# Patient Record
Sex: FEMALE | Race: BLACK OR AFRICAN AMERICAN | Employment: FULL TIME | URBAN - METROPOLITAN AREA
[De-identification: names, ages, dates, MRNs, and addresses within clinical notes are randomized per-mention and may not be internally consistent; named-entity substitution may affect disease eponyms.]

---

## 2017-01-23 ENCOUNTER — HOSPITAL ENCOUNTER (OUTPATIENT)
Dept: LAB | Age: 37
Discharge: HOME | End: 2017-01-23

## 2017-01-23 ENCOUNTER — OFFICE VISIT (OUTPATIENT)
Dept: FAMILY MEDICINE CLINIC | Age: 37
End: 2017-01-23

## 2017-01-23 VITALS
OXYGEN SATURATION: 98 % | RESPIRATION RATE: 20 BRPM | TEMPERATURE: 98.2 F | BODY MASS INDEX: 45.99 KG/M2 | DIASTOLIC BLOOD PRESSURE: 81 MMHG | HEART RATE: 109 BPM | WEIGHT: 293 LBS | HEIGHT: 67 IN | SYSTOLIC BLOOD PRESSURE: 118 MMHG

## 2017-01-23 DIAGNOSIS — N76.0 ACUTE VAGINITIS: ICD-10-CM

## 2017-01-23 DIAGNOSIS — I10 ESSENTIAL HYPERTENSION: ICD-10-CM

## 2017-01-23 DIAGNOSIS — Z79.4 TYPE 2 DIABETES MELLITUS WITHOUT COMPLICATION, WITH LONG-TERM CURRENT USE OF INSULIN (HCC): ICD-10-CM

## 2017-01-23 DIAGNOSIS — E11.9 TYPE 2 DIABETES MELLITUS WITHOUT COMPLICATION, WITH LONG-TERM CURRENT USE OF INSULIN (HCC): Primary | ICD-10-CM

## 2017-01-23 DIAGNOSIS — Z79.4 TYPE 2 DIABETES MELLITUS WITHOUT COMPLICATION, WITH LONG-TERM CURRENT USE OF INSULIN (HCC): Primary | ICD-10-CM

## 2017-01-23 DIAGNOSIS — E11.9 TYPE 2 DIABETES MELLITUS WITHOUT COMPLICATION, WITH LONG-TERM CURRENT USE OF INSULIN (HCC): ICD-10-CM

## 2017-01-23 LAB
BILIRUB UR QL STRIP: NEGATIVE
GLUCOSE UR-MCNC: NORMAL MG/DL
KETONES P FAST UR STRIP-MCNC: NEGATIVE MG/DL
PH UR STRIP: 6 [PH] (ref 4.6–8)
PROT UR QL STRIP: NEGATIVE MG/DL
SENTARA SPECIMEN COL,SENBCF: NORMAL
SP GR UR STRIP: 1 (ref 1–1.03)
UA UROBILINOGEN AMB POC: NORMAL (ref 0.2–1)
URINALYSIS CLARITY POC: CLEAR
URINALYSIS COLOR POC: YELLOW
URINE BLOOD POC: NORMAL
URINE LEUKOCYTES POC: NEGATIVE
URINE NITRITES POC: NEGATIVE

## 2017-01-23 PROCEDURE — 99001 SPECIMEN HANDLING PT-LAB: CPT | Performed by: FAMILY MEDICINE

## 2017-01-23 RX ORDER — FLUCONAZOLE 150 MG/1
150 TABLET ORAL DAILY
Qty: 5 TAB | Refills: 0 | Status: SHIPPED | OUTPATIENT
Start: 2017-01-23 | End: 2017-01-28

## 2017-01-23 RX ORDER — LISINOPRIL AND HYDROCHLOROTHIAZIDE 20; 25 MG/1; MG/1
1 TABLET ORAL DAILY
Qty: 90 TAB | Refills: 1 | Status: SHIPPED | OUTPATIENT
Start: 2017-01-23 | End: 2017-06-15 | Stop reason: SDUPTHER

## 2017-01-23 RX ORDER — CHLORPHENIRAMINE MALEATE 4 MG
TABLET ORAL 2 TIMES DAILY
Qty: 15 G | Refills: 3 | Status: SHIPPED | OUTPATIENT
Start: 2017-01-23

## 2017-01-23 RX ORDER — GLIPIZIDE 10 MG/1
10 TABLET ORAL 2 TIMES DAILY
Qty: 60 TAB | Refills: 3 | Status: SHIPPED | OUTPATIENT
Start: 2017-01-23 | End: 2017-07-21 | Stop reason: SDUPTHER

## 2017-01-23 RX ORDER — DILTIAZEM HYDROCHLORIDE 240 MG/1
240 CAPSULE, COATED, EXTENDED RELEASE ORAL DAILY
Qty: 90 CAP | Refills: 1 | Status: SHIPPED | OUTPATIENT
Start: 2017-01-23 | End: 2018-02-05 | Stop reason: SDUPTHER

## 2017-01-23 NOTE — PROGRESS NOTES
Chronic Illness Visit    Today's Date:  2017   Patient's Name: Shira Abrams   Patient's :  1980     History:     Chief Complaint   Patient presents with    Fatigue     pt here with c/o feeling fatigue and sluggish    High Blood Sugar     pt c/o feeling like her BG is elevated    Yeast Infection     pt c/o vaginal itching and white discharge, was treated around  but has not completed gone away       Shira Abrams is a 39 y.o. female presenting for Chronic Illness no acute concerns today. Diabetes/Hypertension/Hyperlipidemia    BP today is at goal today <130/80. Patients risk factors include: obese  Home Blood Sugars are 100-120. Denies hypoglycemic episodes  Recent hospitalizations: not since last visit  Medications regimen is as follows:  Reports side effects w/ metformin so had to cute down once a day. She says she was having hypoglycemia  Last HgbA1C: 6.1 on 16 and 6.8 (16)  Daily exercise and diet are as follows: reports healthy diet and trying to stay active  Weight is loss of 33 lbs since the last visit  Takes the below meds regularly with no side effects.     Last LDL: 102 (3/10/10)  Last DM eye exam: unknown  Last DM foot exam: 16  Last urine microalbumin: 16    Vaginitis    LMP:2017  Contraception: IUD  Onset of symptoms: 1 months was on diflucan x 1 dose but symptoms have returned  Patient reports odorous thin white discharge  Denies itching/lesions/pain  Denies safe sex  Recent Antibiotics: denies        Past Medical History   Diagnosis Date    Diabetes ([address]     Hypertension     Mitral regurgitation 2016     Past Surgical History   Procedure Laterality Date    Hx  section  12/04/15     Social History     Social History    Marital status:      Spouse name: N/A    Number of children: N/A    Years of education: N/A     Social History Main Topics    Smoking status: Never Smoker    Smokeless tobacco: Never Used    Alcohol use No    Drug use: No    Sexual activity: Not Asked     Other Topics Concern    None     Social History Narrative     Family History   Problem Relation Age of Onset    Diabetes Mother     Heart Attack Father 58     MI    Diabetes Other     Hypertension Other     Cancer Neg Hx     Heart Disease Neg Hx     Stroke Neg Hx      No Known Allergies    Problem List:      Patient Active Problem List   Diagnosis Code    SOB (shortness of breath) R06.02    Hypertension I10    Diabetes ([address] E11.9    Morbid obesity ([address] E66.01    Acute pyelonephritis N10    Septic shock ([facility]) A41.9, R65.21    Hypokalemia E87.6    Hypomagnesemia E83.42    ARDS (adult respiratory distress syndrome) ([facility]) J80    Mitral regurgitation I34.0    PAF (paroxysmal atrial fibrillation) ([facility]) I48.0       Medications:     Current Outpatient Prescriptions   Medication Sig    fluconazole (DIFLUCAN) 150 mg tablet Take 1 Tab by mouth daily for 5 days. FDA advises cautious prescribing of oral fluconazole in pregnancy.  glipiZIDE (GLUCOTROL) 10 mg tablet Take 1 Tab by mouth two (2) times a day.  dilTIAZem CD (CARDIZEM CD) 240 mg ER capsule Take 1 Cap by mouth daily.  lisinopril-hydroCHLOROthiazide (PRINZIDE, ZESTORETIC) 20-25 mg per tablet Take 1 Tab by mouth daily.  clotrimazole (LOTRIMIN) 1 % topical cream Apply  to affected area two (2) times a day.  metFORMIN (GLUCOPHAGE) 500 mg tablet Take 1 Tab by mouth two (2) times daily (with meals).  potassium chloride (K-DUR, KLOR-CON) 20 mEq tablet Take 1 Tab by mouth two (2) times a day. Indications: HYPOKALEMIA    cholecalciferol (VITAMIN D3) 1,000 unit tablet Take 2,000 Units by mouth daily.  PRENATAL #103/IRON FUMARATE/FA (PRENATAL VIT#103-IRON-FA) 27 mg iron- 1 mg tab Take 1 Tab by Mouth Once a Day.  ergocalciferol (ERGOCALCIFEROL) 50,000 unit capsule Take 1 Tab by Mouth Once a Day.  labetalol (NORMODYNE) 200 mg tablet Take 1 Tab by mouth two (2) times a day.  loratadine (CLARITIN) 10 mg tablet Take 1 Tab by mouth daily. No current facility-administered medications for this visit. Constitutional: negative for fevers, fatigue, chills and sweats  Respiratory: negative for cough, sputum or wheezing  Cardiovascular: negative for chest pain, chest pressure/discomfort, dyspnea  Gastrointestinal: negative for nausea, vomiting, diarrhea, constipation and abdominal pain  Genito: positive for vaginal discharge negative for dysuria  Musculoskeletal:negative for myalgias and arthralgias  Neurological: negative for headaches and dizziness  Behavioral/Psych: negative for anxiety and depression    Physical Assessment:   VS:    Visit Vitals    /81 (BP 1 Location: Left arm, BP Patient Position: Sitting)    Pulse (!) 109    Temp 98.2 °F (36.8 °C) (Oral)    Resp 20    Ht 5' 7\" (1.702 m)    Wt 333 lb (151 kg)    LMP 01/16/2017    SpO2 98%    BMI 52.16 kg/m2       Lab Results   Component Value Date/Time    Sodium 142 12/29/2015 02:15 AM    Potassium 3.4 12/29/2015 02:15 AM    Chloride 107 12/29/2015 02:15 AM    CO2 29 12/29/2015 02:15 AM    Anion gap 6 12/29/2015 02:15 AM    Glucose 128 12/29/2015 02:15 AM    BUN 8 12/29/2015 02:15 AM    Creatinine 0.86 12/29/2015 02:15 AM    BUN/Creatinine ratio 9 12/29/2015 02:15 AM    GFR est AA >60 12/29/2015 02:15 AM    GFR est non-AA >60 12/29/2015 02:15 AM    Calcium 8.1 12/29/2015 02:15 AM       General:   Well-groomed, obese, in no distress, pleasant, alert, appropriate and conversant. Mouth:  Good dentition, oropharynx WNL without membranes, exudates, petechiae or ulcers  Cardiovasc:   RRR, no MRG. Pulses 2+ and symmetric at distal extremities. Pulmonary:   Lungs clear bilaterally. Normal respiratory effort. Extremities:   No edema, no TTP bilateral calves. LEs warm and well-perfused. :   Red and swollen vaginal mucosa with white discharge present  Neuro:   Alert and oriented, no focal deficits.  No facial asymmetry noted. Skin:    No rashes or jaundice  MSK:   Normal ROM, 5/5 muscle strength  Psych:  No pressured speech or abnormal thought content        Assessment/Plan & Orders:       1. Type 2 diabetes mellitus without complication, with long-term current use of insulin ([address]    2. Acute vaginitis    3. Essential hypertension        Orders Placed This Encounter    NUSWAB VAGINITIS PLUS    LIPID PANEL    METABOLIC PANEL, COMPREHENSIVE    HEMOGLOBIN A1C WITH EAG    AMB POC URINALYSIS DIP STICK AUTO W/ MICRO    fluconazole (DIFLUCAN) 150 mg tablet    glipiZIDE (GLUCOTROL) 10 mg tablet    dilTIAZem CD (CARDIZEM CD) 240 mg ER capsule    lisinopril-hydroCHLOROthiazide (PRINZIDE, ZESTORETIC) 20-25 mg per tablet    clotrimazole (LOTRIMIN) 1 % topical cream       DM HgbA1c, CMP and Lipid panel ordered. Patient could not tolerate metformin so will change that to glucotrol. HTN BP doing well continue w/ current meds.      Vaginitis will send in diflucan    Obesity improving  counseled on obesity, risks of being obese, commercial diet plans, calorie counting, food journaling, exercise, follow up for weight checks     Follow up in 2-3 months    Cira Siegel MD  Family Medicine  1/23/2017  10:47 AM

## 2017-01-24 LAB
A-G RATIO,AGRAT: 1.3 RATIO (ref 1.1–2.6)
A-G RATIO,AGRAT: 1.3 RATIO (ref 1.1–2.6)
ALBUMIN SERPL-MCNC: 4.1 G/DL (ref 3.5–5)
ALBUMIN SERPL-MCNC: 4.1 G/DL (ref 3.5–5)
ALP SERPL-CCNC: 135 U/L (ref 25–115)
ALP SERPL-CCNC: 135 U/L (ref 25–115)
ALT SERPL-CCNC: 24 U/L (ref 5–40)
ALT SERPL-CCNC: 24 U/L (ref 5–40)
ANION GAP SERPL CALC-SCNC: 22 MMOL/L
ANION GAP SERPL CALC-SCNC: 22 MMOL/L
AST SERPL W P-5'-P-CCNC: 21 U/L (ref 10–37)
AST SERPL W P-5'-P-CCNC: 21 U/L (ref 10–37)
AVG GLU, 10930: 467 MG/DL (ref 91–123)
BILIRUB SERPL-MCNC: 0.2 MG/DL (ref 0.2–1.2)
BILIRUB SERPL-MCNC: 0.2 MG/DL (ref 0.2–1.2)
BUN SERPL-MCNC: 19 MG/DL (ref 6–22)
BUN SERPL-MCNC: 19 MG/DL (ref 6–22)
CALCIUM SERPL-MCNC: 9.2 MG/DL (ref 8.4–10.5)
CALCIUM SERPL-MCNC: 9.2 MG/DL (ref 8.4–10.5)
CHLORIDE SERPL-SCNC: 80 MMOL/L (ref 98–110)
CHLORIDE SERPL-SCNC: 80 MMOL/L (ref 98–110)
CHOLEST SERPL-MCNC: 168 MG/DL (ref 110–200)
CHOLEST SERPL-MCNC: 168 MG/DL (ref 110–200)
CO2 SERPL-SCNC: 22 MMOL/L (ref 20–32)
CO2 SERPL-SCNC: 22 MMOL/L (ref 20–32)
CREAT SERPL-MCNC: 0.9 MG/DL (ref 0.5–1.2)
CREAT SERPL-MCNC: 0.9 MG/DL (ref 0.5–1.2)
GFRAA, 66117: >60
GFRAA, 66117: >60
GFRNA, 66118: >60
GFRNA, 66118: >60
GLOBULIN,GLOB: 3.2 G/DL (ref 2–4)
GLOBULIN,GLOB: 3.2 G/DL (ref 2–4)
GLUCOSE SERPL-MCNC: 791 MG/DL (ref 65–99)
GLUCOSE SERPL-MCNC: 791 MG/DL (ref 65–99)
HBA1C MFR BLD HPLC: 17.9 % (ref 4.8–5.9)
HDLC SERPL-MCNC: 36 MG/DL (ref 40–59)
HDLC SERPL-MCNC: 36 MG/DL (ref 40–59)
LDLC SERPL CALC-MCNC: 65 MG/DL (ref 50–99)
LDLC SERPL CALC-MCNC: 65 MG/DL (ref 50–99)
POTASSIUM SERPL-SCNC: 4.9 MMOL/L (ref 3.5–5.5)
POTASSIUM SERPL-SCNC: 4.9 MMOL/L (ref 3.5–5.5)
PROT SERPL-MCNC: 7.3 G/DL (ref 6.4–8.3)
PROT SERPL-MCNC: 7.3 G/DL (ref 6.4–8.3)
SODIUM SERPL-SCNC: 124 MMOL/L (ref 133–145)
SODIUM SERPL-SCNC: 124 MMOL/L (ref 133–145)
TRIGL SERPL-MCNC: 334 MG/DL (ref 40–149)
TRIGL SERPL-MCNC: 334 MG/DL (ref 40–149)
VLDLC SERPL CALC-MCNC: 67 MG/DL (ref 8–30)
VLDLC SERPL CALC-MCNC: 67 MG/DL (ref 8–30)

## 2017-01-24 RX ORDER — INSULIN PUMP SYRINGE, 3 ML
EACH MISCELLANEOUS
Qty: 1 KIT | Refills: 0 | Status: SHIPPED | OUTPATIENT
Start: 2017-01-24

## 2017-01-24 RX ORDER — INSULIN GLARGINE 100 [IU]/ML
INJECTION, SOLUTION SUBCUTANEOUS
Qty: 4 EACH | Refills: 3 | Status: SHIPPED | OUTPATIENT
Start: 2017-01-24 | End: 2017-03-23 | Stop reason: SDUPTHER

## 2017-01-24 NOTE — PROGRESS NOTES
Patient called today around 9:00 am to discuss urgent labs. Left message and will send in Insulin and glucometer to local pharmacy. Also asked patient to return our call to discuss plan.    Jennifer Crain MD  1/24/2017  9:34 AM

## 2017-01-24 NOTE — PROGRESS NOTES
Patient's labs reflect uncontrolled glucose. HgbA1c 17.9 and glucose >700. Patient called and message left. Patient seen yesterday and does have a history of non compliance with metformin. Plan have patient start Lantus 20 units will titrate up for goal fasting am glucose <150. Will send in glucometer kit and Lantus to local pharmacy. Na also low at 124 most likely due to elevated glucose her corrected Na is 130 (still low) if she has any symptoms I recommend she go to ED immediately. Follow up in 1 month or sooner if she needs training on how to use Lantus. She needs to start Lantus immediately.      Dillon Myers MD  1/24/2017  9:38 AM

## 2017-01-25 LAB
BACTERIAL VAGINOSIS, NAA: NEGATIVE
CANDIDA ALBICANS, NAA, 180056: POSITIVE
CANDIDA GLABRATA, NAA, 180057: POSITIVE
CANDIDA KRUSEI, NAA, 180016: NEGATIVE
CHLAMYDIA TRACHOMATIS, NAA, 180097: NEGATIVE
NEISSERIA GONORRHOEAE, NAA, 180104: NEGATIVE
TRICH VAG BY NAA, 180087: NEGATIVE

## 2017-01-27 ENCOUNTER — TELEPHONE (OUTPATIENT)
Dept: FAMILY MEDICINE CLINIC | Age: 37
End: 2017-01-27

## 2017-01-27 NOTE — TELEPHONE ENCOUNTER
Called patient at 8:15 am at work. I told her I had been trying to call her since Tuesday she said she was having issues with her phone. She states she has been fine denies SOB or any acute symptoms. Told her the her blood sugars were 799 and the highest I had ever seen she states she did start the glipizide and is already feeling better. Dicussed that with blood sugars this high she has to be on Insulin for now. I reiterated that I had sent her Lantus and glucometer to local pharmacy and urged her to pick it up today. Patient said she would pick it up on her lunch. Discussed common signs of DKA and asked patient to go to ED for any issues. Told her the goal is am glucose <150. Told her she needs to check blood sugars daily we will titrate up Lantus and she needs to follow up next week for an appt. She agreed with the plan will call or go to ED with any acute issues. Once again reiterated the severity of this issue and how she much start treatment or go to ED today for help.      Chelsea Pineda MD  1/27/2017  Now

## 2017-02-06 ENCOUNTER — TELEPHONE (OUTPATIENT)
Dept: FAMILY MEDICINE CLINIC | Age: 37
End: 2017-02-06

## 2017-02-06 NOTE — TELEPHONE ENCOUNTER
Called patient to check on her blood sugars. Patient denies any acute concerns has started Lantus 20 Units at bedtime fasting am blood sugars are 240-260s. She reports that she is feeling fine and staying active. Recommended she titrate up slowly to Lantus 24-25 Units at bedtime reiterated ultimate goal is fasting am glucose <150. Also discussed that patient needs to make a follow up appt this week for repeat blood work to check on electrolytes. Patient agreed with the plan.      Alvena Klinefelter, MD  2/5/2017  9:30 Am

## 2017-02-07 ENCOUNTER — TELEPHONE (OUTPATIENT)
Dept: FAMILY MEDICINE CLINIC | Age: 37
End: 2017-02-07

## 2017-02-07 NOTE — TELEPHONE ENCOUNTER
Call made to pt, no answer, left message for pt, to call for a follow up appointment for follow up labs, this is needed because of her elevated labs done previously.

## 2017-02-09 ENCOUNTER — OFFICE VISIT (OUTPATIENT)
Dept: FAMILY MEDICINE CLINIC | Age: 37
End: 2017-02-09

## 2017-02-09 VITALS
HEART RATE: 98 BPM | RESPIRATION RATE: 18 BRPM | DIASTOLIC BLOOD PRESSURE: 75 MMHG | SYSTOLIC BLOOD PRESSURE: 118 MMHG | HEIGHT: 67 IN | TEMPERATURE: 98.5 F | WEIGHT: 293 LBS | OXYGEN SATURATION: 100 % | BODY MASS INDEX: 45.99 KG/M2

## 2017-02-09 DIAGNOSIS — Z79.4 TYPE 2 DIABETES MELLITUS WITHOUT COMPLICATION, WITH LONG-TERM CURRENT USE OF INSULIN (HCC): ICD-10-CM

## 2017-02-09 DIAGNOSIS — Z79.4 TYPE 2 DIABETES MELLITUS WITHOUT COMPLICATION, WITH LONG-TERM CURRENT USE OF INSULIN (HCC): Primary | ICD-10-CM

## 2017-02-09 DIAGNOSIS — E11.9 TYPE 2 DIABETES MELLITUS WITHOUT COMPLICATION, WITH LONG-TERM CURRENT USE OF INSULIN (HCC): ICD-10-CM

## 2017-02-09 DIAGNOSIS — E11.9 TYPE 2 DIABETES MELLITUS WITHOUT COMPLICATION, WITH LONG-TERM CURRENT USE OF INSULIN (HCC): Primary | ICD-10-CM

## 2017-02-09 DIAGNOSIS — E66.01 MORBID OBESITY DUE TO EXCESS CALORIES (HCC): ICD-10-CM

## 2017-02-09 DIAGNOSIS — I10 ESSENTIAL HYPERTENSION: ICD-10-CM

## 2017-02-09 RX ORDER — BUPROPION HYDROCHLORIDE 75 MG/1
75 TABLET ORAL 2 TIMES DAILY
Qty: 60 TAB | Refills: 2 | Status: SHIPPED | OUTPATIENT
Start: 2017-02-09 | End: 2017-06-15 | Stop reason: SDUPTHER

## 2017-02-09 NOTE — PROGRESS NOTES
Chronic Illness Visit    Today's Date:  2017   Patient's Name: Rowena Del Rosario   Patient's :  1980     History:     Chief Complaint   Patient presents with   Johny Badillo is a 39 y.o. female presenting for Chronic Illness no acute concerns today. Diabetes/Hypertension/Hyperlipidemia    BP today is at goal today <130/80. Patients risk factors include: obese  Home Blood Sugars are 250s. Denies hypoglycemic episodes  Recent hospitalizations: not since last visit  Medications regimen is as follows:  Reports side effects w/ metformin so had to cute down once a day. Lantus 24 Units daily. Last HgbA1C: (2017) 17.9  Daily exercise and diet are as follows: reports healthy diet and trying to stay active  Weight  Gain of 10 lbs since the last visit. Takes the below meds regularly with no side effects.     Last LDL: 102 (3/10/10)  Last DM eye exam: unknown  Last DM foot exam: 16  Last urine microalbumin: 16    Obesity    Goal Weight: 300 lbs  Concerns about current weight  Not currently on any medications for this problem  Diet and Exercise routine: reports healthy diet and daily exercise  Weight loss since last visit: none  Motivation Level: high    Past Medical History   Diagnosis Date    Diabetes ([address]     Hypertension     Mitral regurgitation 2016     Past Surgical History   Procedure Laterality Date    Hx  section  12/04/15     Social History     Social History    Marital status:      Spouse name: N/A    Number of children: N/A    Years of education: N/A     Social History Main Topics    Smoking status: Never Smoker    Smokeless tobacco: Never Used    Alcohol use 1.2 oz/week     0 Standard drinks or equivalent, 1 Glasses of wine, 1 Shots of liquor per week      Comment: rarley    Drug use: No    Sexual activity: Yes     Partners: Male     Birth control/ protection: None     Other Topics Concern    None     Social History Narrative     Family History   Problem Relation Age of Onset    Diabetes Mother     Heart Attack Father 58     MI    Diabetes Other     Hypertension Other     Cancer Neg Hx     Heart Disease Neg Hx     Stroke Neg Hx      No Known Allergies    Problem List:      Patient Active Problem List   Diagnosis Code    SOB (shortness of breath) R06.02    Hypertension I10    Diabetes [address] E11.9    Morbid obesity ([facility] E66.01    Acute pyelonephritis N10    Septic shock ([facility]) A41.9, R65.21    Hypokalemia E87.6    Hypomagnesemia E83.42    ARDS (adult respiratory distress syndrome) ([facility]) J80    Mitral regurgitation I34.0    PAF (paroxysmal atrial fibrillation) ([facility]) I48.0       Medications:     Current Outpatient Prescriptions   Medication Sig    buPROPion (WELLBUTRIN) 75 mg tablet Take 1 Tab by mouth two (2) times a day.  insulin glargine (LANTUS SOLOSTAR) 100 unit/mL (3 mL) pen 20 Units daily Subcutaneous    Blood-Glucose Meter monitoring kit For daily blood sugar check    glipiZIDE (GLUCOTROL) 10 mg tablet Take 1 Tab by mouth two (2) times a day.  dilTIAZem CD (CARDIZEM CD) 240 mg ER capsule Take 1 Cap by mouth daily.  lisinopril-hydroCHLOROthiazide (PRINZIDE, ZESTORETIC) 20-25 mg per tablet Take 1 Tab by mouth daily.  cholecalciferol (VITAMIN D3) 1,000 unit tablet Take 2,000 Units by mouth daily.  clotrimazole (LOTRIMIN) 1 % topical cream Apply  to affected area two (2) times a day.  metFORMIN (GLUCOPHAGE) 500 mg tablet Take 1 Tab by mouth two (2) times daily (with meals).  PRENATAL #103/IRON FUMARATE/FA (PRENATAL VIT#103-IRON-FA) 27 mg iron- 1 mg tab Take 1 Tab by Mouth Once a Day.  ergocalciferol (ERGOCALCIFEROL) 50,000 unit capsule Take 1 Tab by Mouth Once a Day.  labetalol (NORMODYNE) 200 mg tablet Take 1 Tab by mouth two (2) times a day.  loratadine (CLARITIN) 10 mg tablet Take 1 Tab by mouth daily.     potassium chloride (K-DUR, KLOR-CON) 20 mEq tablet Take 1 Tab by mouth two (2) times a day. Indications: HYPOKALEMIA     No current facility-administered medications for this visit. Constitutional: negative for fevers, fatigue, chills and sweats  Respiratory: negative for cough, sputum or wheezing  Cardiovascular: negative for chest pain, chest pressure/discomfort, dyspnea  Gastrointestinal: negative for nausea, vomiting, diarrhea, constipation and abdominal pain  Genito: positive for vaginal discharge negative for dysuria  Musculoskeletal:negative for myalgias and arthralgias  Neurological: negative for headaches and dizziness  Behavioral/Psych: negative for anxiety and depression    Physical Assessment:   VS:    Visit Vitals    /75 (BP 1 Location: Right arm, BP Patient Position: Sitting)    Pulse 98    Temp 98.5 °F (36.9 °C) (Oral)    Resp 18    Ht 5' 7\" (1.702 m)    Wt 343 lb 3.2 oz (155.7 kg)    LMP 01/16/2017    SpO2 100%    BMI 53.75 kg/m2       Lab Results   Component Value Date/Time    Sodium 124 01/23/2017 11:41 AM    Sodium 124 01/23/2017 11:41 AM    Potassium 4.9 01/23/2017 11:41 AM    Potassium 4.9 01/23/2017 11:41 AM    Chloride 80 01/23/2017 11:41 AM    Chloride 80 01/23/2017 11:41 AM    CO2 22 01/23/2017 11:41 AM    CO2 22 01/23/2017 11:41 AM    Anion gap 22.0 01/23/2017 11:41 AM    Anion gap 22.0 01/23/2017 11:41 AM    Glucose 791 01/23/2017 11:41 AM    Glucose 791 01/23/2017 11:41 AM    BUN 19 01/23/2017 11:41 AM    BUN 19 01/23/2017 11:41 AM    Creatinine 0.9 01/23/2017 11:41 AM    Creatinine 0.9 01/23/2017 11:41 AM    BUN/Creatinine ratio 9 12/29/2015 02:15 AM    GFR est AA >60 12/29/2015 02:15 AM    GFR est non-AA >60 12/29/2015 02:15 AM    Calcium 9.2 01/23/2017 11:41 AM    Calcium 9.2 01/23/2017 11:41 AM       General:   Well-groomed, obese, in no distress, pleasant, alert, appropriate and conversant. Mouth:  Good dentition, oropharynx WNL without membranes, exudates, petechiae or ulcers  Cardiovasc:   RRR, no MRG.   Pulses 2+ and symmetric at distal extremities. Pulmonary:   Lungs clear bilaterally. Normal respiratory effort. Extremities:   No edema, no TTP bilateral calves. LEs warm and well-perfused. :   Red and swollen vaginal mucosa with white discharge present  Neuro:   Alert and oriented, no focal deficits. No facial asymmetry noted. Skin:    No rashes or jaundice  MSK:   Normal ROM, 5/5 muscle strength  Psych:  No pressured speech or abnormal thought content        Assessment/Plan & Orders:       1. Type 2 diabetes mellitus without complication, with long-term current use of insulin ([facility]    2. Morbid obesity due to excess calories ([address]    3. Essential hypertension        Orders Placed This Encounter    METABOLIC PANEL, COMPREHENSIVE    buPROPion (WELLBUTRIN) 75 mg tablet       DM HgbA1c 17.9 at last visit. Since then patient was started on glipizide and Lantus 24 Units am blood sugars are 240's improving. Recommend titrating up to 30 Units over the next few weeks for goal am glucose <150. HgbA1c at the next visit. Imbalance of electrolytes will repeat CMP. -urine and foot exam at the next visit    HTN BP doing well continue w/ current meds.      Obesity stable discussed that Insulin can cause weight gain will do wellbutrin for a few months to see if this helps with weight  counseled on obesity, risks of being obese, commercial diet plans, calorie counting, food journaling, exercise, follow up for weight checks     Follow up in 6 weeks    Ann Edwards MD  Family Medicine  2/9/2017  9:00 AM

## 2017-02-09 NOTE — PATIENT INSTRUCTIONS

## 2017-03-10 DIAGNOSIS — I10 ESSENTIAL HYPERTENSION: ICD-10-CM

## 2017-03-10 RX ORDER — DILTIAZEM HYDROCHLORIDE 240 MG/1
240 CAPSULE, COATED, EXTENDED RELEASE ORAL DAILY
Qty: 90 CAP | Refills: 1 | Status: CANCELLED | OUTPATIENT
Start: 2017-03-10

## 2017-03-10 RX ORDER — INSULIN GLARGINE 100 [IU]/ML
INJECTION, SOLUTION SUBCUTANEOUS
Qty: 4 EACH | Refills: 3 | Status: CANCELLED | OUTPATIENT
Start: 2017-03-10

## 2017-03-10 RX ORDER — LISINOPRIL AND HYDROCHLOROTHIAZIDE 20; 25 MG/1; MG/1
1 TABLET ORAL DAILY
Qty: 90 TAB | Refills: 1 | Status: CANCELLED | OUTPATIENT
Start: 2017-03-10

## 2017-03-10 NOTE — TELEPHONE ENCOUNTER
Requested Prescriptions     Pending Prescriptions Disp Refills    lisinopril-hydroCHLOROthiazide (PRINZIDE, ZESTORETIC) 20-25 mg per tablet 90 Tab 1     Sig: Take 1 Tab by mouth daily.  dilTIAZem CD (CARDIZEM CD) 240 mg ER capsule 90 Cap 1     Sig: Take 1 Cap by mouth daily.     insulin glargine (LANTUS SOLOSTAR) 100 unit/mL (3 mL) pen 4 Each 3     Si Units daily Subcutaneous

## 2017-03-23 ENCOUNTER — OFFICE VISIT (OUTPATIENT)
Dept: FAMILY MEDICINE CLINIC | Age: 37
End: 2017-03-23

## 2017-03-23 VITALS
SYSTOLIC BLOOD PRESSURE: 120 MMHG | TEMPERATURE: 95.4 F | HEART RATE: 96 BPM | RESPIRATION RATE: 18 BRPM | OXYGEN SATURATION: 100 % | HEIGHT: 67 IN | DIASTOLIC BLOOD PRESSURE: 67 MMHG | WEIGHT: 293 LBS | BODY MASS INDEX: 45.99 KG/M2

## 2017-03-23 DIAGNOSIS — Z79.4 TYPE 2 DIABETES MELLITUS WITHOUT COMPLICATION, WITH LONG-TERM CURRENT USE OF INSULIN (HCC): Primary | ICD-10-CM

## 2017-03-23 DIAGNOSIS — I10 ESSENTIAL HYPERTENSION: ICD-10-CM

## 2017-03-23 DIAGNOSIS — E11.9 TYPE 2 DIABETES MELLITUS WITHOUT COMPLICATION, WITH LONG-TERM CURRENT USE OF INSULIN (HCC): Primary | ICD-10-CM

## 2017-03-23 LAB — HBA1C MFR BLD HPLC: 9.8 %

## 2017-03-23 RX ORDER — INSULIN GLARGINE 100 [IU]/ML
INJECTION, SOLUTION SUBCUTANEOUS
Qty: 4 EACH | Refills: 3 | Status: SHIPPED | OUTPATIENT
Start: 2017-03-23 | End: 2018-02-05 | Stop reason: SDUPTHER

## 2017-03-23 NOTE — MR AVS SNAPSHOT
Visit Information Date & Time Provider Department Dept. Phone Encounter #  
 3/23/2017  9:15 AM Joao Carnes [phone] [id number] Your Appointments 6/8/2017 10:00 AM  
FOLLOW UP EXAM with MD Joao Carnes [facility]) Appt Note: 3 month f/u  
 [address] [facility] [address]  
[facility] [id number] Upcoming Health Maintenance Date Due  
 EYE EXAM RETINAL OR DILATED Q1 5/2/1990 DTaP/Tdap/Td series (1 - Tdap) 5/2/2001 FOOT EXAM Q1 2/18/2017 MICROALBUMIN Q1 2/18/2017 HEMOGLOBIN A1C Q6M 7/23/2017 LIPID PANEL Q1 1/23/2018 PAP AKA CERVICAL CYTOLOGY 1/1/2019 Allergies as of 3/23/2017  Review Complete On: 3/23/2017 By: Colin Santos MD  
 No Known Allergies Current Immunizations  Never Reviewed Name Date Pneumococcal Polysaccharide (PPSV-23) 12/29/2015 10:05 AM  
  
 Not reviewed this visit You Were Diagnosed With   
  
 Codes Comments Type 2 diabetes mellitus without complication, with long-term current use of insulin (HCC)    -  Primary ICD-10-CM: E11.9, Z79.4 ICD-9-CM: 250.00, V58.67 Essential hypertension     ICD-10-CM: I10 
ICD-9-CM: 401.9 Uncontrolled type 2 diabetes mellitus with complication, without long-term current use of insulin (HCC)     ICD-10-CM: E11.8, E11.65 ICD-9-CM: 250.82 Vitals BP Pulse Temp Resp Height(growth percentile) Weight(growth percentile) 120/67 96 95.4 °F (35.2 °C) 18 5' 7\" (1.702 m) 328 lb (148.8 kg) LMP SpO2 BMI OB Status Smoking Status 03/16/2017 100% 51.37 kg/m2 Having regular periods Never Smoker Vitals History BMI and BSA Data Body Mass Index Body Surface Area  
 51.37 kg/m 2 2.65 m 2 Preferred Pharmacy Pharmacy Name Phone [id number]. Siobhan Lozano, [address] [facility] [phone] Your Updated Medication List  
  
   
This list is accurate as of: 3/23/17  9:46 AM.  Always use your most recent med list.  
  
  
  
  
 Blood-Glucose Meter monitoring kit For daily blood sugar check buPROPion 75 mg tablet Commonly known as:  [facility] Take 1 Tab by mouth two (2) times a day. cholecalciferol 1,000 unit tablet Commonly known as:  VITAMIN D3 Take 2,000 Units by mouth daily. clotrimazole 1 % topical cream  
Commonly known as:  Candiss English Apply  to affected area two (2) times a day. dilTIAZem  mg ER capsule Commonly known as:  CARDIZEM CD Take 1 Cap by mouth daily. ergocalciferol 50,000 unit capsule Commonly known as:  ERGOCALCIFEROL Take 1 Tab by Mouth Once a Day. glipiZIDE 10 mg tablet Commonly known as:  Gukira Ramona Take 1 Tab by mouth two (2) times a day. insulin glargine 100 unit/mL (3 mL) pen Commonly known as:  LANTUS SOLOSTAR Up to 30 Units daily Subcutaneous  
  
 labetalol 200 mg tablet Commonly known as:  Oli Tyrone Take 1 Tab by mouth two (2) times a day. lisinopril-hydroCHLOROthiazide 20-25 mg per tablet Commonly known as:  Brissa Polo Take 1 Tab by mouth daily. loratadine 10 mg tablet Commonly known as:  Kevin Blowers Take 1 Tab by mouth daily. potassium chloride 20 mEq tablet Commonly known as:  K-DUR, KLOR-CON Take 1 Tab by mouth two (2) times a day. Indications: HYPOKALEMIA  
  
 prenatal vit#103-iron-FA 27 mg iron- 1 mg Tab Take 1 Tab by Mouth Once a Day. Prescriptions Sent to Pharmacy Refills  
 insulin glargine (LANTUS SOLOSTAR) 100 unit/mL (3 mL) pen 3 Sig: Up to 30 Units daily Subcutaneous Class: Normal  
 Pharmacy: [id number], [address] [facility] #: [phone] We Performed the Following AMB POC HEMOGLOBIN A1C [40549 CPT(R)] Patient Instructions Learning About Diabetes Food Guidelines Your Care Instructions Meal planning is important to manage diabetes. It helps keep your blood sugar at a target level (which you set with your doctor). You don't have to eat special foods. You can eat what your family eats, including sweets once in a while. But you do have to pay attention to how often you eat and how much you eat of certain foods. You may want to work with a dietitian or a certified diabetes educator (CDE) to help you plan meals and snacks. A dietitian or CDE can also help you lose weight if that is one of your goals. What should you know about eating carbs? Managing the amount of carbohydrate (carbs) you eat is an important part of healthy meals when you have diabetes. Carbohydrate is found in many foods. · Learn which foods have carbs. And learn the amounts of carbs in different foods. ¨ Bread, cereal, pasta, and rice have about 15 grams of carbs in a serving. A serving is 1 slice of bread (1 ounce), ½ cup of cooked cereal, or 1/3 cup of cooked pasta or rice. ¨ Fruits have 15 grams of carbs in a serving. A serving is 1 small fresh fruit, such as an apple or orange; ½ of a banana; ½ cup of cooked or canned fruit; ½ cup of fruit juice; 1 cup of melon or raspberries; or 2 tablespoons of dried fruit. ¨ Milk and no-sugar-added yogurt have 15 grams of carbs in a serving. A serving is 1 cup of milk or 2/3 cup of no-sugar-added yogurt. ¨ Starchy vegetables have 15 grams of carbs in a serving. A serving is ½ cup of mashed potatoes or sweet potato; 1 cup winter squash; ½ of a small baked potato; ½ cup of cooked beans; or ½ cup cooked corn or green peas. · Learn how much carbs to eat each day and at each meal. A dietitian or CDE can teach you how to keep track of the amount of carbs you eat. This is called carbohydrate counting.  
· If you are not sure how to count carbohydrate grams, use the Plate Method to plan meals. It is a good, quick way to make sure that you have a balanced meal. It also helps you spread carbs throughout the day. ¨ Divide your plate by types of foods. Put non-starchy vegetables on half the plate, meat or other protein food on one-quarter of the plate, and a grain or starchy vegetable in the final quarter of the plate. To this you can add a small piece of fruit and 1 cup of milk or yogurt, depending on how many carbs you are supposed to eat at a meal. 
· Try to eat about the same amount of carbs at each meal. Do not \"save up\" your daily allowance of carbs to eat at one meal. 
· Proteins have very little or no carbs per serving. Examples of proteins are beef, chicken, turkey, fish, eggs, tofu, cheese, cottage cheese, and peanut butter. A serving size of meat is 3 ounces, which is about the size of a deck of cards. Examples of meat substitute serving sizes (equal to 1 ounce of meat) are 1/4 cup of cottage cheese, 1 egg, 1 tablespoon of peanut butter, and ½ cup of tofu. How can you eat out and still eat healthy? · Learn to estimate the serving sizes of foods that have carbohydrate. If you measure food at home, it will be easier to estimate the amount in a serving of restaurant food. · If the meal you order has too much carbohydrate (such as potatoes, corn, or baked beans), ask to have a low-carbohydrate food instead. Ask for a salad or green vegetables. · If you use insulin, check your blood sugar before and after eating out to help you plan how much to eat in the future. · If you eat more carbohydrate at a meal than you had planned, take a walk or do other exercise. This will help lower your blood sugar. What else should you know? · Limit saturated fat, such as the fat from meat and dairy products. This is a healthy choice because people who have diabetes are at higher risk of heart disease.  So choose lean cuts of meat and nonfat or low-fat dairy products. Use olive or canola oil instead of butter or shortening when cooking. · Don't skip meals. Your blood sugar may drop too low if you skip meals and take insulin or certain medicines for diabetes. · Check with your doctor before you drink alcohol. Alcohol can cause your blood sugar to drop too low. Alcohol can also cause a bad reaction if you take certain diabetes medicines. Follow-up care is a key part of your treatment and safety. Be sure to make and go to all appointments, and call your doctor if you are having problems. It's also a good idea to know your test results and keep a list of the medicines you take. Where can you learn more? Go to http://mirtha-ronn.info/. Enter U016 in the search box to learn more about \"Learning About Diabetes Food Guidelines. \" Current as of: May 23, 2016 Content Version: 11.1 © 8648-4159 "[employer] Care instructions adapted under license by [employer] (which disclaims liability or warranty for this information). If you have questions about a medical condition or this instruction, always ask your healthcare professional. [address] any warranty or liability for your use of this information. Introducing [facility] HE[facility]! Dear Memo Medrano: 
Thank you for requesting a mojio account. Our records indicate that you have previously registered for a mojio account but its currently inactive. Please call our mojio support line at [phone]. Additional Information If you have questions, please visit the Frequently Asked Questions section of the mojio website at [email or web address] Remember, mojio is NOT to be used for urgent needs. For medical emergencies, dial 911. Now available from your iPhone and Android! Please provide this summary of care documentation to your next provider. Your primary care clinician is listed as Seble Wood. If you have any questions after today's visit, please call [phone].

## 2017-03-23 NOTE — PROGRESS NOTES
Chronic Illness Visit    Today's Date:  3/23/2017   Patient's Name: Morales Sweeney   Patient's :  1980     History:     Chief Complaint   Patient presents with    Follow Up Chronic Condition     HTN and Diabetes       Morales Sweeney is a 39 y.o. female presenting for Chronic Illness    Diabetes/Hypertension/Hyperlipidemia    BP today is at goal today <130/80. Patients risk factors include: obese  Home Blood Sugars are 100-130s. Denies hypoglycemic episodes  Recent hospitalizations: not since last visit  Medications regimen is as follows:  Lantus up to 30 Units daily. Last HgbA1C: (2017) 17.9 and 9.8 (2017)  Daily exercise and diet are as follows: reports healthy diet and trying to stay active  Weight loss of 15 lbs since the last visit. Takes the below meds regularly with no side effects.     Last LDL: 102 (3/10/10)  Last DM eye exam: unknown  Last DM foot exam: 16  Last urine microalbumin: 16    Obesity    Goal Weight: 300 lbs  Concerns about current weight  Not currently on any medications for this problem  Diet and Exercise routine: reports healthy diet and daily exercise  Weight loss since last visit: 15 lbs  Motivation Level: high    Past Medical History:   Diagnosis Date    Diabetes ([address]     Hypertension     Mitral regurgitation 2016     Past Surgical History:   Procedure Laterality Date    HX  SECTION  12/04/15     Social History     Social History    Marital status:      Spouse name: N/A    Number of children: N/A    Years of education: N/A     Social History Main Topics    Smoking status: Never Smoker    Smokeless tobacco: Never Used    Alcohol use 1.2 oz/week     0 Standard drinks or equivalent, 1 Glasses of wine, 1 Shots of liquor per week      Comment: wanda    Drug use: No    Sexual activity: Yes     Partners: Male     Birth control/ protection: None     Other Topics Concern    None     Social History Narrative     Family History Problem Relation Age of Onset    Diabetes Mother     Heart Attack Father 58     MI    Diabetes Other     Hypertension Other     Cancer Neg Hx     Heart Disease Neg Hx     Stroke Neg Hx      No Known Allergies    Problem List:      Patient Active Problem List   Diagnosis Code    SOB (shortness of breath) R06.02    Hypertension I10    Diabetes [address] E11.9    Morbid obesity ([facility] E66.01    Acute pyelonephritis N10    Septic shock ([facility]) A41.9, R65.21    Hypokalemia E87.6    Hypomagnesemia E83.42    ARDS (adult respiratory distress syndrome) ([facility]) J80    Mitral regurgitation I34.0    PAF (paroxysmal atrial fibrillation) ([facility]) I48.0       Medications:     Current Outpatient Prescriptions   Medication Sig    insulin glargine (LANTUS SOLOSTAR) 100 unit/mL (3 mL) pen Up to 30 Units daily Subcutaneous    buPROPion (WELLBUTRIN) 75 mg tablet Take 1 Tab by mouth two (2) times a day.  Blood-Glucose Meter monitoring kit For daily blood sugar check    glipiZIDE (GLUCOTROL) 10 mg tablet Take 1 Tab by mouth two (2) times a day.  dilTIAZem CD (CARDIZEM CD) 240 mg ER capsule Take 1 Cap by mouth daily.  lisinopril-hydroCHLOROthiazide (PRINZIDE, ZESTORETIC) 20-25 mg per tablet Take 1 Tab by mouth daily.  cholecalciferol (VITAMIN D3) 1,000 unit tablet Take 2,000 Units by mouth daily.  ergocalciferol (ERGOCALCIFEROL) 50,000 unit capsule Take 1 Tab by Mouth Once a Day.  loratadine (CLARITIN) 10 mg tablet Take 1 Tab by mouth daily.  potassium chloride (K-DUR, KLOR-CON) 20 mEq tablet Take 1 Tab by mouth two (2) times a day. Indications: HYPOKALEMIA    clotrimazole (LOTRIMIN) 1 % topical cream Apply  to affected area two (2) times a day.  PRENATAL #103/IRON FUMARATE/FA (PRENATAL VIT#103-IRON-FA) 27 mg iron- 1 mg tab Take 1 Tab by Mouth Once a Day.  labetalol (NORMODYNE) 200 mg tablet Take 1 Tab by mouth two (2) times a day. No current facility-administered medications for this visit. Constitutional: negative for fevers, fatigue, chills and sweats  Respiratory: negative for cough, sputum or wheezing  Cardiovascular: negative for chest pain, chest pressure/discomfort, dyspnea  Gastrointestinal: negative for nausea, vomiting, diarrhea, constipation and abdominal pain  Genito: positive for vaginal discharge negative for dysuria  Musculoskeletal:negative for myalgias and arthralgias  Neurological: negative for headaches and dizziness  Behavioral/Psych: negative for anxiety and depression    Physical Assessment:   VS:    Visit Vitals    /67    Pulse 96    Temp 95.4 °F (35.2 °C)    Resp 18    Ht 5' 7\" (1.702 m)    Wt 328 lb (148.8 kg)    LMP 03/16/2017    SpO2 100%    BMI 51.37 kg/m2       Lab Results   Component Value Date/Time    Sodium 124 01/23/2017 11:41 AM    Sodium 124 01/23/2017 11:41 AM    Potassium 4.9 01/23/2017 11:41 AM    Potassium 4.9 01/23/2017 11:41 AM    Chloride 80 01/23/2017 11:41 AM    Chloride 80 01/23/2017 11:41 AM    CO2 22 01/23/2017 11:41 AM    CO2 22 01/23/2017 11:41 AM    Anion gap 22.0 01/23/2017 11:41 AM    Anion gap 22.0 01/23/2017 11:41 AM    Glucose 791 01/23/2017 11:41 AM    Glucose 791 01/23/2017 11:41 AM    BUN 19 01/23/2017 11:41 AM    BUN 19 01/23/2017 11:41 AM    Creatinine 0.9 01/23/2017 11:41 AM    Creatinine 0.9 01/23/2017 11:41 AM    BUN/Creatinine ratio 9 12/29/2015 02:15 AM    GFR est AA >60 12/29/2015 02:15 AM    GFR est non-AA >60 12/29/2015 02:15 AM    Calcium 9.2 01/23/2017 11:41 AM    Calcium 9.2 01/23/2017 11:41 AM       General:   Well-groomed, obese, in no distress, pleasant, alert, appropriate and conversant. Mouth:  Good dentition, oropharynx WNL without membranes, exudates, petechiae or ulcers  Cardiovasc:   RRR, no MRG. Pulses 2+ and symmetric at distal extremities. Pulmonary:   Lungs clear bilaterally. Normal respiratory effort. Extremities:   No edema, no TTP bilateral calves. LEs warm and well-perfused.   :   Red and swollen vaginal mucosa with white discharge present  Neuro:   Alert and oriented, no focal deficits. No facial asymmetry noted. Skin:    No rashes or jaundice  MSK:   Normal ROM, 5/5 muscle strength  Psych:  No pressured speech or abnormal thought content        Assessment/Plan & Orders:       1. Type 2 diabetes mellitus without complication, with long-term current use of insulin ([address]    2. Essential hypertension    3. Uncontrolled type 2 diabetes mellitus with complication, without long-term current use of insulin ([address]        Orders Placed This Encounter    AMB POC HEMOGLOBIN A1C    insulin glargine (LANTUS SOLOSTAR) 100 unit/mL (3 mL) pen       DM HgbA1c 17.9 at last visit greatly improved it is 9.8 today. She has also had weight loss and doing well. Patient takes 20-30 Units daily. -foot exam and urine microablumin next week    HTN BP doing well continue w/ current meds.      Obesity stable discussed that Insulin can cause weight gain will do wellbutrin for a few months to see if this helps with weight  counseled on obesity, risks of being obese, commercial diet plans, calorie counting, food journaling, exercise, follow up for weight checks     Follow up in 2-3 months    Irais Rob MD  Family Medicine  3/23/2017  9:00 AM

## 2017-03-23 NOTE — PATIENT INSTRUCTIONS

## 2017-06-15 DIAGNOSIS — E66.01 MORBID OBESITY DUE TO EXCESS CALORIES (HCC): ICD-10-CM

## 2017-06-15 DIAGNOSIS — I10 ESSENTIAL HYPERTENSION: ICD-10-CM

## 2017-06-15 RX ORDER — LISINOPRIL AND HYDROCHLOROTHIAZIDE 20; 25 MG/1; MG/1
1 TABLET ORAL DAILY
Qty: 90 TAB | Refills: 1 | Status: SHIPPED | OUTPATIENT
Start: 2017-06-15 | End: 2018-02-05 | Stop reason: SDUPTHER

## 2017-06-15 RX ORDER — BUPROPION HYDROCHLORIDE 75 MG/1
75 TABLET ORAL 2 TIMES DAILY
Qty: 60 TAB | Refills: 2 | Status: SHIPPED | OUTPATIENT
Start: 2017-06-15 | End: 2018-02-05 | Stop reason: SDUPTHER

## 2017-06-15 NOTE — TELEPHONE ENCOUNTER
Requested Prescriptions     Pending Prescriptions Disp Refills    buPROPion (WELLBUTRIN) 75 mg tablet 60 Tab 2     Sig: Take 1 Tab by mouth two (2) times a day.  lisinopril-hydroCHLOROthiazide (PRINZIDE, ZESTORETIC) 20-25 mg per tablet 90 Tab 1     Sig: Take 1 Tab by mouth daily.

## 2017-06-29 DIAGNOSIS — I10 ESSENTIAL HYPERTENSION: ICD-10-CM

## 2017-06-29 DIAGNOSIS — E11.9 TYPE 2 DIABETES MELLITUS WITHOUT COMPLICATION, WITH LONG-TERM CURRENT USE OF INSULIN (HCC): ICD-10-CM

## 2017-06-29 DIAGNOSIS — Z79.4 TYPE 2 DIABETES MELLITUS WITHOUT COMPLICATION, WITH LONG-TERM CURRENT USE OF INSULIN (HCC): ICD-10-CM

## 2017-06-29 NOTE — TELEPHONE ENCOUNTER
Requested Prescriptions     Pending Prescriptions Disp Refills    dilTIAZem CD (CARDIZEM CD) 240 mg ER capsule 90 Cap 1     Sig: Take 1 Cap by mouth daily.  glipiZIDE (GLUCOTROL) 10 mg tablet 60 Tab 3     Sig: Take 1 Tab by mouth two (2) times a day.

## 2017-07-02 RX ORDER — DILTIAZEM HYDROCHLORIDE 240 MG/1
240 CAPSULE, COATED, EXTENDED RELEASE ORAL DAILY
Qty: 90 CAP | Refills: 1 | OUTPATIENT
Start: 2017-07-02

## 2017-07-02 RX ORDER — GLIPIZIDE 10 MG/1
10 TABLET ORAL 2 TIMES DAILY
Qty: 60 TAB | Refills: 3 | OUTPATIENT
Start: 2017-07-02

## 2017-07-02 NOTE — TELEPHONE ENCOUNTER
Patient needs an appointment for further medication refills. It appears she cancelled her appt 6/30 with Efraín Tijerina and has not rescheduled.

## 2017-07-21 DIAGNOSIS — E11.9 TYPE 2 DIABETES MELLITUS WITHOUT COMPLICATION, WITH LONG-TERM CURRENT USE OF INSULIN (HCC): ICD-10-CM

## 2017-07-21 DIAGNOSIS — Z79.4 TYPE 2 DIABETES MELLITUS WITHOUT COMPLICATION, WITH LONG-TERM CURRENT USE OF INSULIN (HCC): ICD-10-CM

## 2017-07-21 NOTE — TELEPHONE ENCOUNTER
Received faxed refill request from [facility] for glipizide 10 mg, qty 60, 3 refills

## 2017-07-24 RX ORDER — GLIPIZIDE 10 MG/1
10 TABLET ORAL 2 TIMES DAILY
Qty: 60 TAB | Refills: 0 | Status: SHIPPED | OUTPATIENT
Start: 2017-07-24 | End: 2018-02-05 | Stop reason: CLARIF

## 2017-07-24 NOTE — TELEPHONE ENCOUNTER
Rx sent for 30 days with no refills. Patient is overdue for a routine follow up visit. Please schedule.  She cannot get any further refills of this medication without an appointment

## 2017-07-24 NOTE — TELEPHONE ENCOUNTER
Call made to pt , no answer, left message for her to call office. Call in reference to her refill request for Glipizide. Refill request approved for a 30 day supply and pt will have to be seen I office before any other will be granted.

## 2018-02-05 ENCOUNTER — OFFICE VISIT (OUTPATIENT)
Dept: FAMILY MEDICINE CLINIC | Age: 38
End: 2018-02-05

## 2018-02-05 VITALS
HEIGHT: 67 IN | TEMPERATURE: 97.9 F | BODY MASS INDEX: 45.99 KG/M2 | OXYGEN SATURATION: 100 % | HEART RATE: 83 BPM | RESPIRATION RATE: 18 BRPM | DIASTOLIC BLOOD PRESSURE: 69 MMHG | WEIGHT: 293 LBS | SYSTOLIC BLOOD PRESSURE: 105 MMHG

## 2018-02-05 DIAGNOSIS — E66.01 MORBID OBESITY DUE TO EXCESS CALORIES (HCC): ICD-10-CM

## 2018-02-05 DIAGNOSIS — Z79.4 TYPE 2 DIABETES MELLITUS WITHOUT COMPLICATION, WITH LONG-TERM CURRENT USE OF INSULIN (HCC): ICD-10-CM

## 2018-02-05 DIAGNOSIS — I10 ESSENTIAL HYPERTENSION: ICD-10-CM

## 2018-02-05 DIAGNOSIS — E11.9 TYPE 2 DIABETES MELLITUS WITHOUT COMPLICATION, WITH LONG-TERM CURRENT USE OF INSULIN (HCC): ICD-10-CM

## 2018-02-05 DIAGNOSIS — E11.9 TYPE 2 DIABETES MELLITUS WITHOUT COMPLICATION, WITH LONG-TERM CURRENT USE OF INSULIN (HCC): Primary | ICD-10-CM

## 2018-02-05 DIAGNOSIS — Z79.4 TYPE 2 DIABETES MELLITUS WITHOUT COMPLICATION, WITH LONG-TERM CURRENT USE OF INSULIN (HCC): Primary | ICD-10-CM

## 2018-02-05 LAB
HBA1C MFR BLD HPLC: 6.5 %
MICROALBUMIN UR TEST STR-MCNC: 30 MG/L
MICROALBUMIN/CREAT RATIO POC: 200 MG/G

## 2018-02-05 RX ORDER — GLIPIZIDE 10 MG/1
10 TABLET ORAL 2 TIMES DAILY
Qty: 60 TAB | Refills: 6 | Status: CANCELLED | OUTPATIENT
Start: 2018-02-05

## 2018-02-05 RX ORDER — GLIPIZIDE 10 MG/1
10 TABLET, FILM COATED, EXTENDED RELEASE ORAL DAILY
Qty: 90 TAB | Refills: 1 | Status: SHIPPED | OUTPATIENT
Start: 2018-02-05

## 2018-02-05 RX ORDER — INSULIN GLARGINE 100 [IU]/ML
INJECTION, SOLUTION SUBCUTANEOUS
Qty: 4 ML | Refills: 3 | Status: SHIPPED | OUTPATIENT
Start: 2018-02-05

## 2018-02-05 RX ORDER — LISINOPRIL AND HYDROCHLOROTHIAZIDE 20; 25 MG/1; MG/1
1 TABLET ORAL DAILY
Qty: 90 TAB | Refills: 1 | Status: SHIPPED | OUTPATIENT
Start: 2018-02-05

## 2018-02-05 RX ORDER — METFORMIN HYDROCHLORIDE 500 MG/1
500 TABLET ORAL 2 TIMES DAILY WITH MEALS
Qty: 180 TAB | Refills: 1 | Status: SHIPPED | OUTPATIENT
Start: 2018-02-05

## 2018-02-05 RX ORDER — DILTIAZEM HYDROCHLORIDE 240 MG/1
240 CAPSULE, COATED, EXTENDED RELEASE ORAL DAILY
Qty: 90 CAP | Refills: 1 | Status: SHIPPED | OUTPATIENT
Start: 2018-02-05

## 2018-02-05 RX ORDER — BUPROPION HYDROCHLORIDE 75 MG/1
75 TABLET ORAL 2 TIMES DAILY
Qty: 60 TAB | Refills: 3 | Status: SHIPPED | OUTPATIENT
Start: 2018-02-05

## 2018-02-05 NOTE — MR AVS SNAPSHOT
[address] [facility] [address] [phone] Patient: Ady Mandel MRN: [id number] UDS:[id number]/4/0440 Visit Information Date & Time Provider Department Dept. Phone Encounter #  
 2/5/2018 11:00 AM Andreas Castro, [address] [place] [address] [phone] [id number] Upcoming Health Maintenance Date Due  
 EYE EXAM RETINAL OR DILATED Q1 5/2/1990 DTaP/Tdap/Td series (1 - Tdap) 5/2/2001 FOOT EXAM Q1 2/18/2017 MICROALBUMIN Q1 2/18/2017 Influenza Age 5 to Adult 8/1/2017 HEMOGLOBIN A1C Q6M 9/23/2017 LIPID PANEL Q1 1/23/2018 PAP AKA CERVICAL CYTOLOGY 1/1/2019 Allergies as of 2/5/2018  Review Complete On: 2/5/2018 By: JOHN Mott No Known Allergies Current Immunizations  Never Reviewed Name Date Pneumococcal Polysaccharide (PPSV-23) 12/29/2015 10:05 AM  
  
 Not reviewed this visit You Were Diagnosed With   
  
 Codes Comments Type 2 diabetes mellitus without complication, with long-term current use of insulin (HCC)    -  Primary ICD-10-CM: E11.9, Z79.4 ICD-9-CM: 250.00, V58.67 Essential hypertension     ICD-10-CM: I10 
ICD-9-CM: 401.9 Uncontrolled type 2 diabetes mellitus with complication, without long-term current use of insulin (HCC)     ICD-10-CM: E11.8, E11.65 ICD-9-CM: 250.82 Morbid obesity due to excess calories (HCC)     ICD-10-CM: E66.01 
ICD-9-CM: 278.01 Vitals BP Pulse Temp Resp Height(growth percentile) Weight(growth percentile) 105/69 (BP 1 Location: Left arm, BP Patient Position: Sitting) 83 97.9 °F (36.6 °C) (Oral) 18 5' 7\" (1.702 m) 328 lb (148.8 kg) LMP SpO2 BMI OB Status Smoking Status 01/22/2018 (Approximate) 100% 51.37 kg/m2 Having regular periods Never Smoker Vitals History BMI and BSA Data Body Mass Index Body Surface Area  
 51.37 kg/m 2 2.65 m 2 Preferred Pharmacy Pharmacy Name Phone [id number]. [facility], [address] [facility]. [phone] Your Updated Medication List  
  
   
This list is accurate as of: 2/5/18 11:53 AM.  Always use your most recent med list.  
  
  
  
  
 Blood-Glucose Meter monitoring kit For daily blood sugar check buPROPion 75 mg tablet Commonly known as:  [facility] Take 1 Tab by mouth two (2) times a day. cholecalciferol 1,000 unit tablet Commonly known as:  VITAMIN D3 Take 2,000 Units by mouth daily. clotrimazole 1 % topical cream  
Commonly known as:  Opal Wilson Apply  to affected area two (2) times a day. dilTIAZem  mg ER capsule Commonly known as:  CARDIZEM CD Take 1 Cap by mouth daily. ergocalciferol 50,000 unit capsule Commonly known as:  ERGOCALCIFEROL Take 1 Tab by Mouth Once a Day. glipiZIDE SR 10 mg CR tablet Commonly known as:  GLUCOTROL XL Take 1 Tab by mouth daily. insulin glargine 100 unit/mL (3 mL) Inpn Commonly known as:  Luevenia Sujata Up to 35 Units daily Subcutaneous  
  
 labetalol 200 mg tablet Commonly known as:  Rudy Senate Take 1 Tab by mouth two (2) times a day. lisinopril-hydroCHLOROthiazide 20-25 mg per tablet Commonly known as:  Sonjia Aus Take 1 Tab by mouth daily. loratadine 10 mg tablet Commonly known as:  Christiano Hoose Take 1 Tab by mouth daily. potassium chloride 20 mEq tablet Commonly known as:  K-DUR, KLOR-CON Take 1 Tab by mouth two (2) times a day. Indications: HYPOKALEMIA  
  
 prenatal [id number] fum-folic 27 mg iron- 1 mg Tab Take 1 Tab by Mouth Once a Day. Prescriptions Sent to Pharmacy Refills  
 lisinopril-hydroCHLOROthiazide (PRINZIDE, ZESTORETIC) 20-25 mg per tablet 1 Sig: Take 1 Tab by mouth daily. Class: Normal  
 Pharmacy: [id number]. [facility] Staff, [address]. Ph #: [phone]  Route: Oral  
 insulin glargine (LANTUS,BASAGLAR) 100 unit/mL (3 mL) inpn 3 Sig: Up to 35 Units daily Subcutaneous Class: Normal  
 [facility] Ph #: [phone]  
 glipiZIDE SR (GLUCOTROL XL) 10 mg CR tablet 1 Sig: Take 1 Tab by mouth daily. Class: Normal  
 Pharmacy: [facility] Ph #: [phone] Route: Oral  
 dilTIAZem CD (CARDIZEM CD) 240 mg ER capsule 1 Sig: Take 1 Cap by mouth daily. Class: Normal  
 Pharmacy: [facility] [address] Ph #: [phone] Route: Oral  
 buPROPion (WELLBUTRIN) 75 mg tablet 3 Sig: Take 1 Tab by mouth two (2) times a day. Class: Normal  
 Pharmacy: [facility]  [address] Ph #: [phone] Route: Oral  
  
To-Do List   
 02/05/2018 Lab:  CBC WITH AUTOMATED DIFF   
  
 02/05/2018 Lab:  LIPID PANEL   
  
 02/05/2018 Lab:  MICROALBUMIN, UR, RAND W/ MICROALBUMIN/CREA RATIO   
  
 02/05/2018 Lab:  TSH 3RD GENERATION   
  
 05/06/2018 Lab:  METABOLIC PANEL, COMPREHENSIVE Introducing [facility]! Nguyen Bishop introduces [employer] patient portal. Now you can access parts of your medical record, email your doctor's office, and request medication refills online. 1. In your internet browser, go to [email or web address] 2. Click on the First Time User? Click Here link in the Sign In box. You will see the New Member Sign Up page. 3. Enter your [employer] Access Code exactly as it appears below. You will not need to use this code after youve completed the sign-up process. If you do not sign up before the expiration date, you must request a new code. · MyChart Access Code: [email or web address] Expires: 5/6/2018 10:50 AM 
 
4.  Enter the last four digits of your Social Security Number (xxxx) and Date of Birth (mm/dd/yyyy) as indicated and click Submit. You will be taken to the next sign-up page. 5. Create a [employer] ID. This will be your [employer] login ID and cannot be changed, so think of one that is secure and easy to remember. 6. Create a [employer] password. You can change your password at any time. 7. Enter your Password Reset Question and Answer. This can be used at a later time if you forget your password. 8. Enter your e-mail address. You will receive e-mail notification when new information is available in [address] 9. Click Sign Up. You can now view and download portions of your medical record. 10. Click the Download Summary menu link to download a portable copy of your medical information. If you have questions, please visit the Frequently Asked Questions section of the [employer] website. Remember, [employer] is NOT to be used for urgent needs. For medical emergencies, dial 911. Now available from your iPhone and Android! Please provide this summary of care documentation to your next provider. Your primary care clinician is listed as Lindsay Robertson. If you have any questions after today's visit, please call [phone].

## 2018-02-05 NOTE — PROGRESS NOTES
Today's Date:  2018   Patient:  Brad Varela  Patient :  1980    Subjective:     Chief Complaint   Patient presents with    Follow Up Chronic Condition    Medication Refill       Taylor Hughes is a 40 y.o. female who presents for follow up Chronic Diseases. BP today is at goal today <130/80.    States that fasting blood sugars are usually range 110's when she checks it. Denies hypoglycemic episodes. Patient report compliance with medication and denies side effects. Daily exercise and diet are as follows: Is a  at the [facility] and has been lifting weight; has alos bee eating a low carb/sugar/ fat diet. Last HA1c on 2017 was 17.9; On 3/23/2017 9.8; today 6.5. Weight has been stable. Patient is on a statin denies myalgia        Current Outpatient Meds and Allergies     Current Outpatient Prescriptions on File Prior to Visit   Medication Sig Dispense Refill    glipiZIDE (GLUCOTROL) 10 mg tablet Take 1 Tab by mouth two (2) times a day. 60 Tab 0    buPROPion (WELLBUTRIN) 75 mg tablet Take 1 Tab by mouth two (2) times a day. 60 Tab 2    lisinopril-hydroCHLOROthiazide (PRINZIDE, ZESTORETIC) 20-25 mg per tablet Take 1 Tab by mouth daily. 90 Tab 1    insulin glargine (LANTUS SOLOSTAR) 100 unit/mL (3 mL) pen Up to 30 Units daily Subcutaneous 4 Each 3    Blood-Glucose Meter monitoring kit For daily blood sugar check 1 Kit 0    dilTIAZem CD (CARDIZEM CD) 240 mg ER capsule Take 1 Cap by mouth daily. 90 Cap 1    clotrimazole (LOTRIMIN) 1 % topical cream Apply  to affected area two (2) times a day. 15 g 3    cholecalciferol (VITAMIN D3) 1,000 unit tablet Take 2,000 Units by mouth daily.  PRENATAL #103/IRON FUMARATE/FA (PRENATAL VIT#103-IRON-FA) 27 mg iron- 1 mg tab Take 1 Tab by Mouth Once a Day.  ergocalciferol (ERGOCALCIFEROL) 50,000 unit capsule Take 1 Tab by Mouth Once a Day.  labetalol (NORMODYNE) 200 mg tablet Take 1 Tab by mouth two (2) times a day.  61 Tab 0    loratadine (CLARITIN) 10 mg tablet Take 1 Tab by mouth daily. 15 Tab 0    potassium chloride (K-DUR, KLOR-CON) 20 mEq tablet Take 1 Tab by mouth two (2) times a day. Indications: HYPOKALEMIA 10 Tab 0     No current facility-administered medications on file prior to visit. These medications have been reviewed and reconciled with the patient during today's visit. No Known Allergies      ROS:     CONST:   Denies fatigue, weight change, appetite change   NEURO:   Denies headaches, vision changes, dizziness, loss of consciousness  CV:      Denies chest pain, palpitations, orthopnea, PND  PULM:  Denies SOB, wheezing, cough, hemoptysis  GI:             Denies nausea, vomiting, abdominal pain, greasy stools, blood in stool, diarrhea, constipation  :       Denies dysuria, hematuria, change in urine  MS:      Denies muscle/joint pain, joint swelling  SKIN:        Denies rashes, skin changes  ALLERGY: Denies seasonal allergies, itchy eyes    Objective:     VS:    Visit Vitals    /69 (BP 1 Location: Left arm, BP Patient Position: Sitting)    Pulse 83    Temp 97.9 °F (36.6 °C) (Oral)    Resp 18    Ht 5' 7\" (1.702 m)    Wt 328 lb (148.8 kg)    LMP 01/22/2018 (Approximate)    SpO2 100%    BMI 51.37 kg/m2       General:   Well-nourished, well-groomed, pleasant, alert, in no acute distress.      Head:  Normocephalic, atraumatic, MMM,   Ears:  External ears WNL, TMs WNL,   Eyes:  EOMI, PERRL,   Nose:  External nares WNL  Neck:  Neck supple with normal ROM for age, no thyromegaly,  Cardiovasc:   Regular rate and rhythm, no murmurs, no rubs, no gallops,   Pulmonary:   Clear breath sounds bilaterally, good air movement, no wheezing, no rales, no rhonchi, normal respiratory effort  Abdomen:   Abdomen soft, nontender, nondistended, NABS,  Extremities:   No edema, no tenderness with palpation of calves, warm and well-perfused  Neuro:   Alert, conversant, appropriate, following commands, no focal deficits. Pertinent diagnostic procedures include:  No visits with results within 3 Month(s) from this visit. Latest known visit with results is:    Office Visit on 03/23/2017   Component Date Value Ref Range Status    Hemoglobin A1c (POC) 03/23/2017 9.8  % Final       Assessment/Plan:       1. Type 2 diabetes mellitus without complication, with long-term current use of insulin (HCC)  Dose of insulin increased. HA1c better than previously but still elevated. - insulin glargine (LANTUS,BASAGLAR) 100 unit/mL (3 mL) inpn; Up to 35 Units daily Subcutaneous  Dispense: 4 mL; Refill: 3  - glipiZIDE SR (GLUCOTROL XL) 10 mg CR tablet; Take 1 Tab by mouth daily. Dispense: 90 Tab; Refill: 1  - MICROALBUMIN, UR, RAND W/ MICROALBUMIN/CREA RATIO; Future  - LIPID PANEL; Future  - AMB POC HEMOGLOBIN A1C  - AMB POC URINE, MICROALBUMIN, SEMIQUANTITATIVE    2. Essential hypertension  BP controlled, continued current on medications. - lisinopril-hydroCHLOROthiazide (PRINZIDE, ZESTORETIC) 20-25 mg per tablet; Take 1 Tab by mouth daily. Dispense: 90 Tab; Refill: 1  - dilTIAZem CD (CARDIZEM CD) 240 mg ER capsule; Take 1 Cap by mouth daily. Dispense: 90 Cap; Refill: 1  - CBC WITH AUTOMATED DIFF; Future  - MICROALBUMIN, UR, RAND W/ MICROALBUMIN/CREA RATIO; Future  - METABOLIC PANEL, COMPREHENSIVE; Future    4. Morbid obesity due to excess calories (HCC)  Continue exercising   - buPROPion (WELLBUTRIN) 75 mg tablet; Take 1 Tab by mouth two (2) times a day. Dispense: 60 Tab; Refill: 3  - LIPID PANEL; Future  - TSH 3RD GENERATION; Future    Called patient back and advised her to stop Lantus and start Metformin and she  Has her HA1c is now 6.5. She will continue with healthy diet, exercise and BS monitoring. Will recheck lab in 3 month to make sure that she is maintaining a low low HA1c. Will call the pharmacy to cancel Lantus. I have discussed the diagnosis with the patient and the intended plan as seen in the above orders.   The patient has received an after-visit summary along with patient information handout. I have discussed medication side effects and warnings with the patient as well. Pt verbalized understanding. Follow-up Disposition:  Return in about 3 months (around 5/5/2018).   JOHN Gomez  2/5/2018, 11:43 AM

## 2018-05-06 DIAGNOSIS — I10 ESSENTIAL HYPERTENSION: ICD-10-CM

## 2021-09-29 NOTE — PATIENT INSTRUCTIONS
Learning About Diabetes Food Guidelines  Your Care Instructions  Meal planning is important to manage diabetes. It helps keep your blood sugar at a target level (which you set with your doctor). You don't have to eat special foods. You can eat what your family eats, including sweets once in a while. But you do have to pay attention to how often you eat and how much you eat of certain foods. You may want to work with a dietitian or a certified diabetes educator (CDE) to help you plan meals and snacks. A dietitian or CDE can also help you lose weight if that is one of your goals. What should you know about eating carbs? Managing the amount of carbohydrate (carbs) you eat is an important part of healthy meals when you have diabetes. Carbohydrate is found in many foods. · Learn which foods have carbs. And learn the amounts of carbs in different foods. ¨ Bread, cereal, pasta, and rice have about 15 grams of carbs in a serving. A serving is 1 slice of bread (1 ounce), ½ cup of cooked cereal, or 1/3 cup of cooked pasta or rice. ¨ Fruits have 15 grams of carbs in a serving. A serving is 1 small fresh fruit, such as an apple or orange; ½ of a banana; ½ cup of cooked or canned fruit; ½ cup of fruit juice; 1 cup of melon or raspberries; or 2 tablespoons of dried fruit. ¨ Milk and no-sugar-added yogurt have 15 grams of carbs in a serving. A serving is 1 cup of milk or 2/3 cup of no-sugar-added yogurt. ¨ Starchy vegetables have 15 grams of carbs in a serving. A serving is ½ cup of mashed potatoes or sweet potato; 1 cup winter squash; ½ of a small baked potato; ½ cup of cooked beans; or ½ cup cooked corn or green peas. · Learn how much carbs to eat each day and at each meal. A dietitian or CDE can teach you how to keep track of the amount of carbs you eat. This is called carbohydrate counting. · If you are not sure how to count carbohydrate grams, use the Plate Method to plan meals.  It is a good, quick way to make Physical Therapy     Referred by: Desean Arciniega MD; Medical Diagnosis (from order):    Diagnosis Information      Diagnosis    726.10 (ICD-9-CM) - M75.81 (ICD-10-CM) - Rotator cuff tendonitis, right                Daily Treatment Note -  Physical Therapy    Visit:  10     SUBJECTIVE                                                                                                               I was able to bowl and my shoulder felt fine after and fine the next day as well. I also hit some tennis balls for an hour today, I felt discomfort on some hits but not all; when I did feel it, pain during hitting motion would spike to 5-6/10.    OBJECTIVE                                                                                                                        TREATMENT                                                                                                                initial evaluation completed    Therapeutic Exercise:    Supine ER at 90 degrees abduction: x30, 4#, towel roll under elbow   S/l shoulder ER, 4#: 3x10, towel roll under elbow to maintain mechanics  Shoulder ER at 90 degrees abd: seated 3x10, 4# elbow propped on bolster  Shoulder horizontal abd in standing, RTB: 3x10, cues for scap retraction and depress scapulas  Shoulder ER at 90 abduction, standing against wall: 2x8, cues for scap retraction and depression  Supine shoulder circles 4#: x30 each  CKC Ball circles in standing flexion and abduction: 2x30, cues to maintain elbow extension  Incline push up on counter: 3x10    Not today:  Wall clock: 3x5, RTB  Shoulder PNF D2 flexion: 3x10, RTB                Skilled input: verbal instruction/cues and tactile instruction/cues    Writer verbally educated and received verbal consent for hand placement, positioning of patient, and techniques to be performed today from patient for hand placement and palpation for techniques and therapist position for techniques as described above and how they  are pertinent to the patient's plan of care.    Home Exercise Program/Education Materials: Access Code: [email or web address]  URL: [email or web address]  Date: 08/12/2021  Prepared by: Cheyenne Tobar    Exercises  · Shoulder External Rotation with Anchored Resistance - 1 x daily - 7 x weekly - 10 reps - 3 sets  · Sidelying Shoulder ER with Towel and Dumbbell - 1 x daily - 7 x weekly - 3 sets - 10 reps  · Seated Shoulder External Rotation in Abduction Supported with Dumbbell - 1 x daily - 7 x weekly - 3 sets - 10 reps  · Standing Shoulder Internal Rotation with Anchored Resistance - 1 x daily - 7 x weekly - 3 sets - 10 reps  · Standing Shoulder Single Arm PNF D2 Flexion with Resistance - 1 x daily - 7 x weekly - 3 sets - 10 reps  · Standing Shoulder Horizontal Abduction with Resistance - 1 x daily - 7 x weekly - 3 sets - 10 reps  · Supine Shoulder External Rotation Stretch - 1 x daily - 7 x weekly - 3 sets - 10 reps  · Wall Clock with Theraband - 1 x daily - 7 x weekly - 3 sets - 10 reps  · Push Up on Table - 1 x daily - 7 x weekly - 3 sets - 10 reps  · Kneeling Plank on Forearms with Scapular Protraction Retraction AROM - 1 x daily - 7 x weekly - 1 sets - 4-5 reps - 15 seconds hold         ASSESSMENT                                                                                                             If no shoulder soreness persists, pt to perform one hour of tennis hitting over weekend. Pt educated on self assessing baseline shoulder symptoms pre and post tennis hitting. Continued pain at end range R shoulder ER; continuing to strengthen and load.    Patient Education:   Results of above outlined education: Verbalizes understanding and Demonstrates understanding      PLAN                                                                                                                           Suggestions for next session as indicated: Progress per plan of care      GOALS                                sure that you have a balanced meal. It also helps you spread carbs throughout the day. ¨ Divide your plate by types of foods. Put non-starchy vegetables on half the plate, meat or other protein food on one-quarter of the plate, and a grain or starchy vegetable in the final quarter of the plate. To this you can add a small piece of fruit and 1 cup of milk or yogurt, depending on how many carbs you are supposed to eat at a meal.  · Try to eat about the same amount of carbs at each meal. Do not \"save up\" your daily allowance of carbs to eat at one meal.  · Proteins have very little or no carbs per serving. Examples of proteins are beef, chicken, turkey, fish, eggs, tofu, cheese, cottage cheese, and peanut butter. A serving size of meat is 3 ounces, which is about the size of a deck of cards. Examples of meat substitute serving sizes (equal to 1 ounce of meat) are 1/4 cup of cottage cheese, 1 egg, 1 tablespoon of peanut butter, and ½ cup of tofu. How can you eat out and still eat healthy? · Learn to estimate the serving sizes of foods that have carbohydrate. If you measure food at home, it will be easier to estimate the amount in a serving of restaurant food. · If the meal you order has too much carbohydrate (such as potatoes, corn, or baked beans), ask to have a low-carbohydrate food instead. Ask for a salad or green vegetables. · If you use insulin, check your blood sugar before and after eating out to help you plan how much to eat in the future. · If you eat more carbohydrate at a meal than you had planned, take a walk or do other exercise. This will help lower your blood sugar. What else should you know? · Limit saturated fat, such as the fat from meat and dairy products. This is a healthy choice because people who have diabetes are at higher risk of heart disease. So choose lean cuts of meat and nonfat or low-fat dairy products. Use olive or canola oil instead of butter or shortening when cooking.   · Don't                                                                                             Improve shoulder ROM, met  Improve shoulder strength, progressing  Decrease pain, progressing  The above improvements in impairments to assist in obtaining goals listed below  Long Term Goals: to be met by end of plan of care  1. Patient will report ability to a game of tennis with less than 2/10 pain in R shoulder  Status: progressing/ongoing  2. Patient will reach behind back, overhead, and our of reach with reported manageable/tolerable pain for tasks for independent living including putting groceries away, doing hair and retrieving items from cabinets  Status: progressing/ongoing  3. Patient will sleep 6 hours without disruption from pain/difficulty with positional changes.  Status: met   4. Patient will be independent with progressed and modified home exercise program.  Status: progressing/ongoing  5. Quick DASH: Patient will complete form to reflect an improved calculated score improvement to equal to or greater than 16% change to indicate patient reported improvement in function/disability/impairment. (minimal clinically important difference = 15.91) Status: met       Therapy procedure time and total treatment time can be found documented on the Time Entry flowsheet   skip meals. Your blood sugar may drop too low if you skip meals and take insulin or certain medicines for diabetes. · Check with your doctor before you drink alcohol. Alcohol can cause your blood sugar to drop too low. Alcohol can also cause a bad reaction if you take certain diabetes medicines. Follow-up care is a key part of your treatment and safety. Be sure to make and go to all appointments, and call your doctor if you are having problems. It's also a good idea to know your test results and keep a list of the medicines you take. Where can you learn more? Go to http://mirtha-ronn.info/. Enter M867 in the search box to learn more about \"Learning About Diabetes Food Guidelines. \"  Current as of: May 23, 2016  Content Version: 11.1  © 5260-0513 [employer]. Care instructions adapted under license by [employer] (which disclaims liability or warranty for this information). If you have questions about a medical condition or this instruction, always ask your healthcare professional. [address] any warranty or liability for your use of this information.

## 2022-12-28 ENCOUNTER — HOSPITAL ENCOUNTER (OUTPATIENT)
Dept: PHYSICAL THERAPY | Age: 42
Discharge: HOME | End: 2022-12-28
Payer: COMMERCIAL

## 2022-12-28 PROCEDURE — 97530 THERAPEUTIC ACTIVITIES: CPT

## 2022-12-28 PROCEDURE — 97110 THERAPEUTIC EXERCISES: CPT

## 2022-12-28 PROCEDURE — 97162 PT EVAL MOD COMPLEX 30 MIN: CPT

## 2022-12-28 NOTE — THERAPY EVALUATION
[facility]  [address] [place] [address]  [phone] fax    Plan of Care/ Statement of Necessity for Physical Therapy Services    Patient name: Vicenta Raines Start of Care: 2022   Referral source: Angela Spence NP : 1980    Medical Diagnosis: Foot drop, left foot [M21.372]  Payor: Jaclyn Coburn / Plan: Florin Ritesh [place] H[employer] / Product Type: HMO /  Onset Date:2022    Treatment Diagnosis: Left LE weakness   Prior Hospitalization: see medical history Provider#: [id number]   Medications: Verified on Patient summary List    Comorbidities: HTN, diabetes   Prior Level of Function: ; teaches cycling at [employer] and enjoys working out; lives in 2 story home with family     The Plan of Care and following information is based on the information from the initial evaluation. Assessment/ key information:  Patient is a 80-year-old female who presents to therapy with diagnosis of drop foot. Patient reports she fell trying to get school supplies to the car. She had an uneven area in her yard and her phone rang and she fell. Her foot began to swell as the day went on but she did not have pain. She ended of having a 5th metatarsal fracture. She was wearing a boot for several weeks and noticed she was unable to lift her foot up. She reports near full healing of fracture as of last physician appt. MRI for low back was unremarkable; she is scheduled to have an EMG in February. Swelling has improved; she reports tingling to foot with prolonged standing/ambulation. She reports navigating stairs with step to pattern. Symptoms aggravated with biking >10-12 min; symptoms reduced with elevation, massage, and medication. Patient presents to therapy ambulating with steppage gait, decreased speed, and decreased left stance time. Exam reveals decreased left ankle AROM in all planes and decreased left ankle DF, inversion, and eversion strength.  Decreased hip strength noted B. TTP noted to left 5th metatarsal and ATFL/CFL. Patient able to maintain tandem stance for 4 sec with left foot posterior and 11 sec with right foot posterior. Patient would benefit from skilled outpatient PT to address above mentioned deficits to return to prior level of function, increase independence with ADLs, and improve overall quality of life. Evaluation Complexity History MEDIUM  Complexity : 1-2 comorbidities / personal factors will impact the outcome/ POC ; Examination MEDIUM Complexity : 3 Standardized tests and measures addressing body structure, function, activity limitation and / or participation in recreation  ;Presentation MEDIUM Complexity : Evolving with changing characteristics  ; Clinical Decision Making MEDIUM Complexity : FOTO score of 26-74  Overall Complexity Rating: MEDIUM  Problem List: pain affecting function, decrease ROM, decrease strength, edema affecting function, impaired gait/ balance, decrease ADL/ functional abilitiies, decrease activity tolerance, decrease flexibility/ joint mobility, and decrease transfer abilities   Treatment Plan may include any combination of the following: Therapeutic exercise, Neuromuscular reeducation, Manual therapy, Therapeutic activity, Self care/home management, Electric stim unattended , Vasopneumatic device, Gait training, Ultrasound, and Electric stim attended  Patient / Family readiness to learn indicated by: asking questions, trying to perform skills, and interest  Persons(s) to be included in education: patient (P)  Barriers to Learning/Limitations: None  Patient Goal (s): to get back to normal  Patient Self Reported Health Status: good  Rehabilitation Potential: good    Short Term Goals: To be accomplished in 1 weeks:  1. Patient will report compliance with initial HEP to optimize therapy outcomes. Status at eval:established    Long Term Goals: To be accomplished in 6 weeks:  1.  Patient will improve FOTO score by at least 5 points in order to demonstrate functional improvement. Status at eval: 49/100  2. Patient will report no more than \"little difficulty\" with \"walking a mile\" with FOTO in order to demonstrate improved tolerance to daily tasks. Status at eval: \"quite a bit of difficulty\"  3. Patient will improve left ankle AROM to at least 5 deg DF and 20 deg inversion in order to ambulate with increased ease. Status at eval: 0 deg DF, 10 deg inversion  4. Patient will improve left ankle strength to at least 4-/5 with MMT (in available range) in order to perform functional tasks with increased ease. Status at eval:    Left  Right   Dorsiflexion 1+ 4+   Plantarflexion 5 4+   Inversion 2+ 4   Eversion 3 4+     5. Patient will be able to maintain tandem stance for at least 20 sec B in order to perform self care tasks with increased ease. Status at eval: left posterior 4 sec, right posterior 11 sec    Frequency / Duration: Patient to be seen 2-3 times per week for 6 weeks. Patient/  Caregiver education and instruction: Diagnosis, prognosis, self care, activity modification, and exercises   [x]  Plan of care has been reviewed with SUMIT Archibald, PT 12/28/2022 9:55 AM    ________________________________________________________________________    I certify that the above Therapy Services are being furnished while the patient is under my care. I agree with the treatment plan and certify that this therapy is necessary.     [de-identified] Signature:____________Date:_________TIME:________     Radha Mars NP  ** Signature, Date and Time must be completed for valid certification **    Please sign and return to [facility]  [address] [place] [address]  [phone] fax

## 2022-12-28 NOTE — PROGRESS NOTES
PT DAILY TREATMENT NOTE/FOOT AND ANKLE EVAL     Patient Name: Skinny Albert  Date:2022  : 1980  [x]  Patient  Verified  Payor: Jacob Lees / Plan: [address] [place] [id number] [employer] / Product Type: HMO /    In time:12:05P  Out time:1:00P  Total Treatment Time (min): 55  Visit #:  12      Treatment Area: Foot drop, left foot [M21.372]    SUBJECTIVE  Pain Level (0-10 scale): 0/10 (at worst:3/10)  []constant []intermittent []improving []worsening []no change since onset    Any medication changes, allergies to medications, adverse drug reactions, diagnosis change, or new procedure performed?: [x] No    [] Yes (see summary sheet for update)  Subjective functional status/changes:       Comorbidities: HTN, diabetes   Prior Level of Function: ; teaches cycling at [employer] and enjoys working out; lives in 2 [place] home with family     The Plan of Care and following information is based on the information from the initial evaluation. Assessment/ key information:  Patient is a 51-year-old female who presents to therapy with diagnosis of drop foot. Patient reports she fell trying to get school supplies to the car. She had an uneven area in her yard and her phone rang and she fell. Her foot began to swell as the day went on but she did not have pain. She ended of having a 5th metatarsal fracture. She was wearing a boot for several weeks and noticed she was unable to lift her foot up. She reports near full healing of fracture as of last physician appt. MRI for low back was unremarkable; she is scheduled to have an EMG in February. Swelling has improved; she reports tingling to foot with prolonged standing/ambulation. She reports navigating stairs with step to pattern. Symptoms aggravated with biking >10-12 min; symptoms reduced with elevation, massage, and medication. Patient presents to therapy ambulating with steppage gait, decreased speed, and decreased left stance time.  Exam reveals decreased left ankle AROM in all planes and decreased left ankle DF, inversion, and eversion strength. Decreased hip strength noted B. TTP noted to left 5th metatarsal and ATFL/CFL. Patient able to maintain tandem stance for 4 sec with left foot posterior and 11 sec with right foot posterior. Patient would benefit from skilled outpatient PT to address above mentioned deficits to return to prior level of function, increase independence with ADLs, and improve overall quality of life.     20 min [x]Eval                  []Re-Eval       15 min Therapeutic Exercise:  [] See flow sheet : HEP review   Rationale: increase ROM, increase strength, improve coordination, improve balance, and increase proprioception to improve the patients ability to perform ADLs with increased ease    15 min Therapeutic Activity:  []  See flow sheet : patient education   Rationale: increase ROM, increase strength, improve coordination, improve balance, and increase proprioception  to improve the patients ability to perform ADLs with increased ease          With   [] TE   [x] TA   [] neuro   [] other: Patient Education: [x] Review HEP    [] Progressed/Changed HEP based on:   [] positioning   [] body mechanics   [] transfers   [] heat/ice application    [x] other: diagnosis, prognosis, POC, purpose and importance of therapy; anatomy and physiology as it relates to current condition; mechanism of electrical stimulation; purpose of hip strength for overall LE stability     Other Objective/Functional Measures:     Physical Therapy Evaluation  - Foot and Ankle    Gait: [] Normal    [x] Abnormal    [] Antalgic    [] NWB    Device:  Describe:    ROM/Strength  [] Unable to assess at this time      AROM        PROM            Strength (1-5)   Left Right Left Right Left  Right   Dorsiflexion 0 10   1+ 4+   Plantarflexion 10 15   5 4+   Inversion 10 47   2+ 4   Eversion 17-compensatory knee motions 17   3 4+   Great Toe Ext         Great Toe Flex           Flexibility: [] Unable to assess at this time  Gastroc:    (L) Tightness [x] WNL   [] Min   [] Mod   [] Severe    (R) Tightness [x] WNL   [] Min   [] Mod   [] Severe  Soleus:    (L) Tightness [] WNL   [] Min   [] Mod   [] Severe    (R) Tightness [] WNL   [] Min   [] Mod   [] Severe  Other:      (L) Tightness [] WNL   [] Min   [] Mod   [] Severe    (R) Tightness [] WNL   [] Min   [] Mod   [] Severe    Palpation:   Location:  Patient's Pain Response: [] Min   [] Mod   [] Severe  Location:  Patient's Pain Response: [] Min   [] Mod   [] Severe    Other tests/ comments:    No contraindications to electrical stimulation reported    Tandem: left posterior 4 sec, right posterior 11 sec    MSR 30 sec B    B genu recurvatum and valgus-more on left     Right LE MMT: hip flex 3+ ER 4+  IR 5  abd 4+ add 4+ ext 3 knee flex 4 ext 4+  Left LE MMT: hip flex 3+ ER IR  abd 3+ add 2+ ext 3+ knee flex 4+ ext 4+             Rigidity to metatarsals on right     TTP to  lateral ligaments and 5th metatarsal, ATFL and CFL    Pain Level (0-10 scale) post treatment: 0/10    ASSESSMENT/Changes in Function: See POC    Patient will continue to benefit from skilled PT services to modify and progress therapeutic interventions, address functional mobility deficits, address ROM deficits, address strength deficits, analyze and address soft tissue restrictions, analyze and cue movement patterns, analyze and modify body mechanics/ergonomics, assess and modify postural abnormalities, address imbalance/dizziness, and instruct in home and community integration to attain remaining goals.      [x]  See Plan of Care  []  See progress note/recertification  []  See Discharge Summary         Progress towards goals / Updated goals:  See POC    PLAN  []  Upgrade activities as tolerated     []  Continue plan of care  []  Update interventions per flow sheet       []  Discharge due to:_  []  Other:_      Dorene [place], PT 12/28/2022  9:54 AM

## 2023-01-11 ENCOUNTER — HOSPITAL ENCOUNTER (OUTPATIENT)
Dept: PHYSICAL THERAPY | Age: 43
Discharge: HOME | End: 2023-01-11
Payer: COMMERCIAL

## 2023-01-11 PROCEDURE — 97032 APPL MODALITY 1+ESTIM EA 15: CPT

## 2023-01-11 PROCEDURE — 97530 THERAPEUTIC ACTIVITIES: CPT

## 2023-01-11 PROCEDURE — 97110 THERAPEUTIC EXERCISES: CPT

## 2023-01-11 NOTE — PROGRESS NOTES
PT DAILY TREATMENT NOTE    Patient Name: Sharon Corral  Date:2023  : 1980  [x]  Patient  Verified  Payor: Shazia Bhatti / Plan: Gregor Enrique HMO / Product Type: HMO /    In time: 9:40   Out time: 10:25  Total Treatment Time (min): 45  Visit #: 2 of 12    Treatment Area: Weakness of left lower extremity [R29.898]    SUBJECTIVE  Pain Level (0-10 scale): 0/10  []constant []intermittent []improving []worsening []no change since onset    Any medication changes, allergies to medications, adverse drug reactions, diagnosis change, or new procedure performed?: [x] No    [] Yes (see summary sheet for update)  Subjective functional status/changes:     Pt reports no changes since eval and no pain/tingling. She started her HEP, it is going okay, sometimes she gets a little confused looking at the pictures. OBJECTIVE    Modality rationale: increase muscle contraction/control to improve the patients ability to ambulate without drop foot.    Min Type Additional Details   10 [x] Estim: [x]Att   []Unatt  []TENS instruct                 []IFC  []Premod []  NMES       [x]Other: 10:10 w/ 2 sec ramp []w/US   []w/ice   []w/heat  Position: long sitting with ankles over edge of bed  Location: left tibialis anterior    []  Traction: [] Cervical       []Lumbar                       [] Prone          []Supine                       []Intermittent   []Continuous Lbs:  [] before manual  [] after manual    []  Ultrasound: []Continuous   [] Pulsed                           []1MHz   []3MHz Location:  W/cm2:    []  Iontophoresis with dexamethasone         Location: [] Take home patch   [] In clinic    []  Ice     []  heat  []  Ice massage Position:  Location:    []  Vasopneumatic Device:  If using vaso (only need to measure limb vaso being performed on)      pre-treatment girth :       post-treatment girth :       measured at (landmark location)  Pressure: [] lo [] med [] hi   Temp: [] lo [] med [] hi   [x] Skin assessment post-treatment: [x]intact []redness- no adverse reaction       []redness - adverse reaction:      15 min Therapeutic Exercise:  [] See flow sheet :    Rationale: increase ROM, increase strength, improve coordination, improve balance, and increase proprioception to improve the patients ability to perform ADLs with increased ease    15 min Therapeutic Activity:  []  See flow sheet : patient education   Rationale: increase ROM, increase strength, improve coordination, improve balance, and increase proprioception  to improve the patients ability to perform ADLs with increased ease     5  NC  By ATC min Manual Therapy: KT to left tibialis anterior with 50% pull to facilitate left ankle DF with anchor strip across dorsal foot - 3 inch tape  Technique:       [] STM []ASTM [] TPR []PROM [] Stretching  [] Jt manipulation []Gr I [] II []  III [] IV [] V []  Treatment Area: left ankle/tib A  Other: The manual therapy interventions were performed at a separate and distinct time from the therapeutic activities interventions. Rationale:  to increase ms contraction and facilitate DF  to allow patient to ambulate with ease. With   [x] TE   [] TA   [] neuro   [] other: Patient Education: [x] Review HEP    [] Progressed/Changed HEP based on:   [] positioning   [] body mechanics   [] transfers   [] heat/ice application    [] other:     Other Objective/Functional Measures:   - 10 minutes late  - challenged with marble pickup  - initiated NMES for ankle DF  - trial of KT for DF - by ATC    Pain Level (0-10 scale) post treatment: 0/10    ASSESSMENT/Changes in Function:   Initiated PT POC today per flow sheet, requiring vc and demo 100% of the time for proper form and technique with TE. Initiated NMES however unable to facilitate ms contraction so therapist assisted with DF and patient attempted to hold and perform eccentric DF; poor ability. Trial of KT to facilitate DF as well with education on adverse reaction to look for.      Patient will continue to benefit from skilled PT services to modify and progress therapeutic interventions, address functional mobility deficits, address ROM deficits, address strength deficits, analyze and address soft tissue restrictions, analyze and cue movement patterns, analyze and modify body mechanics/ergonomics, assess and modify postural abnormalities, address imbalance/dizziness, and instruct in home and community integration to attain remaining goals. [x]  See Plan of Care  []  See progress note/recertification  []  See Discharge Summary         Progress towards goals / Updated goals:  Short Term Goals: To be accomplished in 1 weeks:  1. Patient will report compliance with initial HEP to optimize therapy outcomes. Status at eval:established  Current: compliant (1/11/23)     Long Term Goals: To be accomplished in 6 weeks:  1. Patient will improve FOTO score by at least 5 points in order to demonstrate functional improvement. Status at eval: 49/100    2. Patient will report no more than \"little difficulty\" with \"walking a mile\" with FOTO in order to demonstrate improved tolerance to daily tasks. Status at eval: \"quite a bit of difficulty\"    3. Patient will improve left ankle AROM to at least 5 deg DF and 20 deg inversion in order to ambulate with increased ease. Status at eval: 0 deg DF, 10 deg inversion    4. Patient will improve left ankle strength to at least 4-/5 with MMT (in available range) in order to perform functional tasks with increased ease. Status at eval:     Left  Right   Dorsiflexion 1+ 4+   Plantarflexion 5 4+   Inversion 2+ 4   Eversion 3 4+      5. Patient will be able to maintain tandem stance for at least 20 sec B in order to perform self care tasks with increased ease.                Status at eval: left posterior 4 sec, right posterior 11 sec      PLAN  []  Upgrade activities as tolerated     []  Continue plan of care  []  Update interventions per flow sheet       []  Discharge due to:_  []  Other:_      Molly Culver, PTA 1/11/2023  10:25 AM

## 2023-01-13 ENCOUNTER — HOSPITAL ENCOUNTER (OUTPATIENT)
Dept: PHYSICAL THERAPY | Age: 43
Discharge: HOME | End: 2023-01-13
Payer: COMMERCIAL

## 2023-01-13 PROCEDURE — 97530 THERAPEUTIC ACTIVITIES: CPT

## 2023-01-13 PROCEDURE — 97032 APPL MODALITY 1+ESTIM EA 15: CPT

## 2023-01-13 NOTE — PROGRESS NOTES
PT DAILY TREATMENT NOTE    Patient Name: Neftaly Salinas  Date:2023  : 1980  [x]  Patient  Verified  Payor: Joanie Reveal / Plan: [employer] / Product Type: HMO /    In time: 9:30    Out time: 10:00  Total Treatment Time (min): 30  Visit #: 3 of 12    Treatment Area: Weakness of left lower extremity [R29.898]    SUBJECTIVE  Pain Level (0-10 scale): 0/10  []constant []intermittent []improving []worsening []no change since onset    Any medication changes, allergies to medications, adverse drug reactions, diagnosis change, or new procedure performed?: [x] No    [] Yes (see summary sheet for update)  Subjective functional status/changes:   Pt states no pain. She doesn't have her EMG until February. She still can't lift her foot. She did have more nerve feelings after trying estim last time. She didn't notice a difference with the tape. OBJECTIVE    Modality rationale: increase muscle contraction/control to improve the patients ability to ambulate without drop foot.    Min Type Additional Details   10 [x] Estim: [x]Att   []Unatt  []TENS instruct                 []IFC  []Premod []  NMES       [x]Other: 10:20 w/ 3 sec ramp []w/US   []w/ice   []w/heat  Position: long sitting with ankles over edge of bed  Location: left tibialis anterior    []  Traction: [] Cervical       []Lumbar                       [] Prone          []Supine                       []Intermittent   []Continuous Lbs:  [] before manual  [] after manual    []  Ultrasound: []Continuous   [] Pulsed                           []1MHz   []3MHz Location:  W/cm2:    []  Iontophoresis with dexamethasone         Location: [] Take home patch   [] In clinic    []  Ice     []  heat  []  Ice massage Position:  Location:    []  Vasopneumatic Device:  If using vaso (only need to measure limb vaso being performed on)      pre-treatment girth :       post-treatment girth :       measured at (landmark location)  Pressure: [] lo [] med [] hi   Temp: [] lo [] med [] hi   [x] Skin assessment post-treatment:  [x]intact []redness- no adverse reaction       []redness - adverse reaction:        20 min Therapeutic Activity:  [x]  See flow sheet : patient education; AFO   Rationale: increase ROM, increase strength, improve coordination, improve balance, and increase proprioception  to improve the patients ability to perform ADLs with increased ease     With   [x] TE   [] TA   [] neuro   [] other: Patient Education: [x] Review HEP    [] Progressed/Changed HEP based on:   [] positioning   [] body mechanics   [] transfers   [] heat/ice application    [] other:     Other Objective/Functional Measures:   Unable to elicit tibialis anterior contraction  Pt agreeable to AFO use temporarily until further diagnostic testing  B genu valgus collapse    Pain Level (0-10 scale) post treatment: 0/10    ASSESSMENT/Changes in Function:   Pt continues to be unable to get tibialis anterior contraction with estim but was more willing to use AFO for safety with ambulation. She was educated to try to call for getting in to EMG sooner. Will continue to use estim in attempts to engage tibialis anterior. Patient will continue to benefit from skilled PT services to modify and progress therapeutic interventions, address functional mobility deficits, address ROM deficits, address strength deficits, analyze and address soft tissue restrictions, analyze and cue movement patterns, analyze and modify body mechanics/ergonomics, assess and modify postural abnormalities, address imbalance/dizziness, and instruct in home and community integration to attain remaining goals. [x]  See Plan of Care  []  See progress note/recertification  []  See Discharge Summary         Progress towards goals / Updated goals:  Short Term Goals: To be accomplished in 1 weeks:  1. Patient will report compliance with initial HEP to optimize therapy outcomes.   Status at [facility]:established  Current: compliant (1/11/23)     Long Term Goals: To be accomplished in 6 weeks:  1. Patient will improve FOTO score by at least 5 points in order to demonstrate functional improvement. Status at eval: 49/100    2. Patient will report no more than \"little difficulty\" with \"walking a mile\" with FOTO in order to demonstrate improved tolerance to daily tasks. Status at eval: \"quite a bit of difficulty\"    3. Patient will improve left ankle AROM to at least 5 deg DF and 20 deg inversion in order to ambulate with increased ease. Status at eval: 0 deg DF, 10 deg inversion    4. Patient will improve left ankle strength to at least 4-/5 with MMT (in available range) in order to perform functional tasks with increased ease. Status at eval:     Left  Right   Dorsiflexion 1+ 4+   Plantarflexion 5 4+   Inversion 2+ 4   Eversion 3 4+      5. Patient will be able to maintain tandem stance for at least 20 sec B in order to perform self care tasks with increased ease.                Status at eval: left posterior 4 sec, right posterior 11 sec      PLAN  []  Upgrade activities as tolerated     []  Continue plan of care  []  Update interventions per flow sheet       []  Discharge due to:_  []  Other:_      Rajinder Mulligan PTA 1/13/2023  10:25 AM

## 2023-01-16 ENCOUNTER — HOSPITAL ENCOUNTER (OUTPATIENT)
Dept: PHYSICAL THERAPY | Age: 43
Discharge: HOME | End: 2023-01-16
Payer: COMMERCIAL

## 2023-01-16 PROCEDURE — 97032 APPL MODALITY 1+ESTIM EA 15: CPT

## 2023-01-16 PROCEDURE — 97110 THERAPEUTIC EXERCISES: CPT

## 2023-01-16 PROCEDURE — 97112 NEUROMUSCULAR REEDUCATION: CPT

## 2023-01-16 NOTE — PROGRESS NOTES
PT DAILY TREATMENT NOTE     Patient Name: Dinesh Marina  Date:2023  : 1980  [x]  Patient  Verified  Payor: Susanne Porter / Plan: [employer] / Product Type: HMO /    In time:11:31  Out time:12:20  Total Treatment Time (min): 52  Visit #: 4 of 12      Treatment Area: Weakness of left lower extremity [R29.898]    SUBJECTIVE  Pain Level (0-10 scale): 0/10  Any medication changes, allergies to medications, adverse drug reactions, diagnosis change, or new procedure performed?: [x] No    [] Yes (see summary sheet for update)  Subjective functional status/changes:   [] No changes reported  Pt works that her EMG is scheduled for the first week of February. She follows up with the orthopedic MD tomorrow. She tried wearing the AFO for a little, but it was irritating the skin on the back of her calf, so she took it off quickly.       OBJECTIVE    Modality rationale: increase muscle contraction/control to improve the patients ability to reduce foot drop for increased ease/safety with ambulation   Min Type Additional Details    [] Estim:  []Unatt       []IFC  []Premod                        []Other:  []w/ice   []w/heat  Position:  Location:   13 (with set up)  [x] Estim: [x]Att    []TENS instruct  []NMES                    [x]Other: 10:20 w/ 3 sec ramp  []w/US   []w/ice   []w/heat  Position: long sit with ankles over edge of bed  Location: left tibialis anterior     []  Traction: [] Cervical       []Lumbar                       [] Prone          []Supine                       []Intermittent   []Continuous Lbs:  [] before manual  [] after manual    []  Ultrasound: []Continuous   [] Pulsed                           []1MHz   []3MHz W/cm2:  Location:    []  Iontophoresis with dexamethasone         Location: [] Take home patch   [] In clinic    []  Ice     []  heat  []  Ice massage  []  Laser   []  Anodyne Position:  Location:    []  Laser with stim  []  Other:  Position:  Location:    []  Vasopneumatic Device []  Right     []  Left  Pre-treatment girth:  Post-treatment girth:  Measured at (location):  Pressure:       [] lo [] med [] hi   Temperature: [] lo [] med [] hi   [x] Skin assessment post-treatment:  [x]intact []redness- no adverse reaction    []redness - adverse reaction:       25 min Therapeutic Exercise:  [x]  See flow sheet :   Rationale: increase ROM, increase strength, and increase proprioception  to improve the patients ability to improve ease of ambulation and daily tasks      11 min Neuromuscular Re-education:  []  See flow sheet : Tandem Stance, dorsiflexion eccentric lower, marble     Rationale: increase strength, improve coordination, and increase proprioception  to improve the patients ability to improve ease of ambulation and daily tasks    With   [] TE   [] TA   [] neuro   [] other: Patient Education: [x] Review HEP    [] Progressed/Changed HEP based on:   [] positioning   [] body mechanics   [] transfers   [] heat/ice application    [] other:      Other Objective/Functional Measures:     Subjective information obtained during bike      Extensor hallucis longus and extensor digitorum longus contraction evident with NMES     No skin abnormality left calf. Pt reports she did not notice skin abnormality as she removed the AFO quickly when it started to irritate her skin     Continued challenge with marble  but improving ability noted     Educated pt regarding kinetic chain alignment and importance of avoiding valgus collapse with mini squats    Able to maintain tandem stance on floor with increased sway B for 30 seconds     Palpable contraction and slight movement with eversion AROM. Cues to focus on bringing up toes into extension to facilitate use of accessory musculature for dorsiflexion with long sit dorsiflexion with eccentric lower.   Therapist assisted with concentric movement and cued pt to control eccentric lowering     Pain Level (0-10 scale) post treatment: 0/10    ASSESSMENT/Changes in Function:     Pt is making slow progress towards initial goals in therapy. Contraction of extensor hallucis longus and extensor digitorum longus evident with NMES with pads placed over tibialis anterior. Will continue trial of e-stim as contraction of accessory musculature with dorsiflexion was evident despite continued lack of tibialis anterior contraction. Pt trialed AFO but reported skin irritation and removed after brief use. She was advised to bring AFO to next session to attempt padding to reduce skin irritation. Will continue to address strength, ROM, and flexibility deficits in order to improve ease/safety with ambulation and daily tasks. Patient will continue to benefit from skilled PT services to modify and progress therapeutic interventions, address functional mobility deficits, address ROM deficits, address strength deficits, analyze and address soft tissue restrictions, analyze and cue movement patterns, analyze and modify body mechanics/ergonomics, assess and modify postural abnormalities, address imbalance/dizziness, and instruct in home and community integration to attain remaining goals. Progress towards goals / Updated goals:  Short Term Goals: To be accomplished in 1 weeks:  1. Patient will report compliance with initial HEP to optimize therapy outcomes. Status at [facility]:established  Current: compliant (1/11/23)     Long Term Goals: To be accomplished in 6 weeks:  1. Patient will improve FOTO score by at least 5 points in order to demonstrate functional improvement. Status at [facility]: 49/100     2. Patient will report no more than \"little difficulty\" with \"walking a mile\" with FOTO in order to demonstrate improved tolerance to daily tasks. Status at [facility]: \"quite a bit of difficulty\"  Current Status:  Progressing. Pt reports she could walk a mile but it would be very because she would have to concentrate on her foot placement 1/16/23     3.  Patient will improve left ankle AROM to at least 5 deg DF and 20 deg inversion in order to ambulate with increased ease. Status at eval: 0 deg DF, 10 deg inversion     4. Patient will improve left ankle strength to at least 4-/5 with MMT (in available range) in order to perform functional tasks with increased ease. Status at eval:     Left  Right   Dorsiflexion 1+ 4+   Plantarflexion 5 4+   Inversion 2+ 4   Eversion 3 4+      5. Patient will be able to maintain tandem stance for at least 20 sec B in order to perform self care tasks with increased ease. Status at eval: left posterior 4 sec, right posterior 11 sec      Current Status: Goal met.   30 seconds B 1/16/23      PLAN  [x]  Upgrade activities as tolerated     [x]  Continue plan of care  []  Update interventions per flow sheet       []  Discharge due to:_  []  Other:_      Jareth Mclean PT 1/16/2023  11:36 AM    Future Appointments   Date Time Provider Kiana Mccrary   1/18/2023 11:00 AM Khushbu Castaneda, PTA [facility]   1/23/2023 10:00 AM Luis Acosta, PT [facility]   1/25/2023 11:00 AM Khushbu Castaneda, PTA [facility]   1/30/2023 10:00 AM Luis Acosta, PT [facility]   2/1/2023 10:00 AM Khushbu Castaneda, PTA [facility]   2/6/2023 10:00 AM Luis Acosta, PT [facility]   2/8/2023 10:00 AM Khushbu Castaneda, PTA [facility]   2/13/2023 10:00 AM Luis Acosta, PT [facility]   2/15/2023 10:00 AM Khushbu Castaneda, PTA [facility]

## 2023-01-18 ENCOUNTER — HOSPITAL ENCOUNTER (OUTPATIENT)
Dept: PHYSICAL THERAPY | Age: 43
End: 2023-01-18
Payer: COMMERCIAL

## 2023-01-18 ENCOUNTER — TELEPHONE (OUTPATIENT)
Dept: PHYSICAL THERAPY | Age: 43
End: 2023-01-18

## 2023-01-20 ENCOUNTER — HOSPITAL ENCOUNTER (OUTPATIENT)
Dept: PHYSICAL THERAPY | Age: 43
Discharge: HOME | End: 2023-01-20
Payer: COMMERCIAL

## 2023-01-20 PROCEDURE — 97112 NEUROMUSCULAR REEDUCATION: CPT

## 2023-01-20 PROCEDURE — 97110 THERAPEUTIC EXERCISES: CPT

## 2023-01-20 NOTE — PROGRESS NOTES
PT DAILY TREATMENT NOTE     Patient Name: Jany Marcos  VNJ  : 1980  [x]  Patient  Verified  Payor: Vena Duane / Plan: [employer] / Product Type: HMO /    In time:930  Out time:1001  Total Treatment Time (min): 31  Visit #: 5 of 12    Treatment Area: Weakness of left lower extremity [R29.898]    SUBJECTIVE  Pain Level (0-10 scale): 2/10  Any medication changes, allergies to medications, adverse drug reactions, diagnosis change, or new procedure performed?: [x] No    [] Yes (see summary sheet for update)  Subjective functional status/changes:   [] No changes reported  Pt stated that she is doing all right today    OBJECTIVE    22 min Therapeutic Exercise:  [x]  See flow sheet :   Rationale: increase ROM, increase strength, and increase proprioception  to improve the patients ability to improve ease of ambulation and daily tasks      9 min Neuromuscular Re-education:  [x]  See flow sheet : Tandem Stance, dorsiflexion eccentric lower, marble     Rationale: increase strength, improve coordination, and increase proprioception  to improve the patients ability to improve ease of ambulation and daily tasks          With   [x] TE   [] TA   [] neuro   [] other: Patient Education: [x] Review HEP    [] Progressed/Changed HEP based on:   [] positioning   [] body mechanics   [] transfers   [] heat/ice application    [] other:      Other Objective/Functional Measures:   Cont to have difficulty with marbles  No complaint of increased pain during session   Pt to bring AFO next visit for more padding  Cont with decreased DF in the left ankle     Pain Level (0-10 scale) post treatment: 0/10    ASSESSMENT/Changes in Function:   Pt is making slow progress toward goals. Cont with significant drop foot on the left.  Pt arrived to therapy without AFO stating that she forgot it and will bring it in next visit so additional padding can be added    Patient will continue to benefit from skilled PT services to modify and progress therapeutic interventions, address functional mobility deficits, address ROM deficits, address strength deficits, analyze and address soft tissue restrictions, analyze and cue movement patterns, analyze and modify body mechanics/ergonomics, assess and modify postural abnormalities, address imbalance/dizziness, and instruct in home and community integration to attain remaining goals. [x]  See Plan of Care  []  See progress note/recertification  []  See Discharge Summary         Progress towards goals / Updated goals:  Short Term Goals: To be accomplished in 1 weeks:  1. Patient will report compliance with initial HEP to optimize therapy outcomes. Status at eval:established  Current: compliant (1/11/23)     Long Term Goals: To be accomplished in 6 weeks:  1. Patient will improve FOTO score by at least 5 points in order to demonstrate functional improvement. Status at eval: 49/100     2. Patient will report no more than \"little difficulty\" with \"walking a mile\" with FOTO in order to demonstrate improved tolerance to daily tasks. Status at eval: \"quite a bit of difficulty\"  Current Status:  Progressing. Pt reports she could walk a mile but it would be very because she would have to concentrate on her foot placement 1/16/23     3. Patient will improve left ankle AROM to at least 5 deg DF and 20 deg inversion in order to ambulate with increased ease. Status at eval: 0 deg DF, 10 deg inversion     4. Patient will improve left ankle strength to at least 4-/5 with MMT (in available range) in order to perform functional tasks with increased ease. Status at eval:     Left  Right   Dorsiflexion 1+ 4+   Plantarflexion 5 4+   Inversion 2+ 4   Eversion 3 4+      5. Patient will be able to maintain tandem stance for at least 20 sec B in order to perform self care tasks with increased ease.                Status at eval: left posterior 4 sec, right posterior 11 sec Current Status: Goal met.   30 seconds B 1/16/23      PLAN  []  Upgrade activities as tolerated     [x]  Continue plan of care  []  Update interventions per flow sheet       []  Discharge due to:_  []  Other:_      Gabriel Laws, PTA 1/20/2023  8:14 AM    Future Appointments   Date Time Provider Kiana Mccrary   1/20/2023  9:30 AM Philip Steward, PTA [facility]   1/23/2023 10:00 AM Hudgins Dorean Favre, PT [facility]   1/25/2023 11:00 AM Dia Infante, PTA [facility]   1/30/2023 10:00 AM Hudgins Dorean Favre, PT [facility]   2/1/2023 10:00 AM Dia Infante, PTA [facility]   2/6/2023 10:00 AM Hudgins Dorean Favre, PT [facility]   2/8/2023 10:00 AM Dia Infante, PTA [facility]   2/13/2023 10:00 AM Hudgins Dorean Favre, PT [facility]   2/15/2023 10:00 AM Dia Infante, PTA [facility]

## 2023-01-23 ENCOUNTER — HOSPITAL ENCOUNTER (OUTPATIENT)
Dept: PHYSICAL THERAPY | Age: 43
Discharge: HOME | End: 2023-01-23
Payer: COMMERCIAL

## 2023-01-23 PROCEDURE — 97110 THERAPEUTIC EXERCISES: CPT

## 2023-01-23 PROCEDURE — 97530 THERAPEUTIC ACTIVITIES: CPT

## 2023-01-23 NOTE — PROGRESS NOTES
PT DAILY TREATMENT NOTE     Patient Name: Anisha Callahan  VJSE:  : 1980  [x]  Patient  Verified  Payor: Gabrielle Bravo / Plan: [employer] / Product Type: HMO /    In time:10:00A  Out time:10:35A  Total Treatment Time (min): 35  Visit #: 6 of 12      Treatment Area: Weakness of left lower extremity [R29.978]    SUBJECTIVE  Pain Level (0-10 scale): 1/10  Any medication changes, allergies to medications, adverse drug reactions, diagnosis change, or new procedure performed?: [x] No    [] Yes (see summary sheet for update)  Subjective functional status/changes:   [] No changes reported  Patient reports things are ok today. She reports things are ok overall. She notices her front toes are now able to lift off the ground. She followed up with NP on . She is going to have an MRI on her ankle and will get a referral to an orthotist. The AFO is not pinching as bad as she wore a long sock but would be very uncomfortable otherwise. She does not feel a difference with the electrical stimulation but will still feel the tingling following.      OBJECTIVE        25 min Therapeutic Exercise:  [] See flow sheet :   Rationale: increase ROM, increase strength, improve coordination, improve balance, and increase proprioception to improve the patients ability to ambulate and perform daily tasks with increased ease    10 min Therapeutic Activity:  []  See flow sheet : building up AFO with moleskin/foam for patient comfort   Rationale: increase ROM, increase strength, improve coordination, improve balance, and increase proprioception  to improve the patients ability to ambulate and perform daily tasks with increased ease             With   [] TE   [] TA   [] neuro   [] other: Patient Education: [x] Review HEP    [] Progressed/Changed HEP based on:   [] positioning   [] body mechanics   [] transfers   [] heat/ice application    [] other:      Other Objective/Functional Measures:      Trace activation with ankle DF  Able to perform great toe ext without assistance but limited activation with ext of smaller toes  Min contraction with sidelying eversion   Added moleskin to superior edge and foam cushion to lateral edges of AFO   Reviewed following up with physician following EMG  With AFO donned-ambulating with foot clearance WNL of unaffected side   Continued excessive genu valgus and tibial eversion with ambulation      Pain Level (0-10 scale) post treatment: 0/10    ASSESSMENT/Changes in Function: Patient making fair overall progress towards goals. Patient continues with trace ankle DF muscle activation with limited motion likely result of compensations. She continues to report discomfort with AFO wear due to increased adipose tissue to calf. Additional cushioning added to AFO as above with min subjective improvement reported with wear. Plan to continue with NMES to facilitate ankle DF in future visits and formally reassess goals next visit. Patient will continue to benefit from skilled PT services to modify and progress therapeutic interventions, address functional mobility deficits, address ROM deficits, address strength deficits, analyze and address soft tissue restrictions, analyze and cue movement patterns, analyze and modify body mechanics/ergonomics, assess and modify postural abnormalities, address imbalance/dizziness, and instruct in home and community integration to attain remaining goals. Progress towards goals / Updated goals:  Short Term Goals: To be accomplished in 1 weeks:  1. Patient will report compliance with initial HEP to optimize therapy outcomes. Status at [facility]:established  Current: compliant (1/11/23)     Long Term Goals: To be accomplished in 6 weeks:  1. Patient will improve FOTO score by at least 5 points in order to demonstrate functional improvement. Status at [facility]: 49/100     2.  Patient will report no more than \"little difficulty\" with \"walking a mile\" with FOTO in order to demonstrate improved tolerance to daily tasks. Status at eval: \"quite a bit of difficulty\"  Current Status:  Progressing. Pt reports she could walk a mile but it would be very because she would have to concentrate on her foot placement 1/16/23     3. Patient will improve left ankle AROM to at least 5 deg DF and 20 deg inversion in order to ambulate with increased ease. Status at eval: 0 deg DF, 10 deg inversion     4. Patient will improve left ankle strength to at least 4-/5 with MMT (in available range) in order to perform functional tasks with increased ease. Status at eval:     Left  Right   Dorsiflexion 1+ 4+   Plantarflexion 5 4+   Inversion 2+ 4   Eversion 3 4+      5. Patient will be able to maintain tandem stance for at least 20 sec B in order to perform self care tasks with increased ease. Status at eval: left posterior 4 sec, right posterior 11 sec                 Current Status: Goal met.   30 seconds B 1/16/23         PLAN  []  Upgrade activities as tolerated     []  Continue plan of care  []  Update interventions per flow sheet       []  Discharge due to:_  []  Other:_      Armando Snyder PT 1/23/2023  8:47 AM    Future Appointments   Date Time Provider Kiana Mccrary   1/23/2023 10:00 AM Tierra Hui [facility]   1/25/2023 11:00 AM Giuseppe Christian PTA [facility]   1/30/2023 10:00 AM Luis Robledo, PT [facility]   2/1/2023 10:00 AM Giuseppe Christian PTA [facility]   2/6/2023 10:00 AM Luis Robledo, PT [facility]   2/8/2023 10:00 AM Giuseppe Christian PTA [facility]   2/13/2023 10:00 AM Luis Robledo, PT [facility]   2/15/2023 10:00 AM Giuseppe Christian PTA [facility]

## 2023-01-25 ENCOUNTER — HOSPITAL ENCOUNTER (OUTPATIENT)
Dept: PHYSICAL THERAPY | Age: 43
End: 2023-01-25
Payer: COMMERCIAL

## 2023-01-26 ENCOUNTER — HOSPITAL ENCOUNTER (OUTPATIENT)
Dept: PHYSICAL THERAPY | Age: 43
Discharge: HOME | End: 2023-01-26
Payer: COMMERCIAL

## 2023-01-26 PROCEDURE — 97110 THERAPEUTIC EXERCISES: CPT

## 2023-01-26 PROCEDURE — 97530 THERAPEUTIC ACTIVITIES: CPT

## 2023-01-26 NOTE — PROGRESS NOTES
[facility] - [address]  [phone] fax    Physical Therapy Progress Note  Patient name: Katharina Norris Start of Care: 2022   Referral source: Jose Knight NP : 1980                Medical Diagnosis: Foot drop, left foot [M21.372]  Payor: Kt Oliva / Plan: [address] [place], [place] [address] H[place] / Product Type: HMO /  Onset Date:2022                Treatment Diagnosis: Left LE weakness   Prior Hospitalization: see medical history Provider#: [id number]   Medications: Verified on Patient summary List    Comorbidities: HTN, diabetes   Prior Level of Function: ; teaches cycling at [employer] and enjoys working out; lives in 2 story home with family      Visits from [place] [facility]: 7    Missed Visits: 1    Established Goals:         Excellent           Good         Limited           None  [x] Increased ROM   []  []  [x]  []  [x] Increased Strength  []  []  [x]  []  [x] Increased Mobility  []  []  [x]  []   [] Decreased Pain   []  []  []  []  [] Decreased Swelling  []  []  []  []    Key Functional Changes: patient is making slow progress toward goals in therapy. Patient reports compliance with HEP. Patient stated that she could probably walk one mile with moderate difficulty. AROM increased for left ankle inversion to 15 degrees but remains 0 degrees for DF. Left ankle strength increased per chart below. Patient continues with trace ankle DF muscle activation with limited motion likely result of compensations as patient demonstrates improved activation with cues for performing great toe ext. Static balance is improving and patient is now able to perform tandem stance for 30 seconds bilaterally without LOB. FOTO score increased from 49 to 54/100 which indicates some improvement in functional ability.  She was issued AFO to assist with ambulation and demonstrates improved foot clearance with use but reports discomfort at gastroc secondary to increased adipose tissue in area; trialed addition cushioning to AFO to address. Trialed NMES to facilitate left ankle DF; contraction of extensor hallucis longus and extensor digitorum longus evident with NMES with pads placed over tibialis anterior but no tibialis anterior contraction noted. Updated Goals: to be achieved in 4 weeks:   1. Patient will improve FOTO score to at least 70/100 points in order to demonstrate functional improvement. Status at eval: 54/100  2. Patient will report no more than \"little difficulty\" with \"walking a mile\" with FOTO in order to demonstrate improved tolerance to daily tasks. Status at eval: \"moderate difficulty\"  3. Patient will improve left ankle AROM to at least 5 deg DF and 20 deg inversion in order to ambulate with increased ease. Status at eval: 0 deg DF, 15 deg inversion  4. Patient will improve left ankle strength to at least 4-/5 with MMT (in available range) in order to perform functional tasks with increased ease.                Status at eval:     Left  Right Left   Dorsiflexion 1+ 4+ 1+   Plantarflexion 5 4+ 5   Inversion 2+ 4 3   Eversion 3 4+ 3       ASSESSMENT/RECOMMENDATIONS:Patient will continue to benefit from skilled PT services to modify and progress therapeutic interventions, address functional mobility deficits, address ROM deficits, address strength deficits, analyze and address soft tissue restrictions, analyze and cue movement patterns, analyze and modify body mechanics/ergonomics, assess and modify postural abnormalities, address imbalance/dizziness, and instruct in home and community integration to attain remaining goals    [x]Continue therapy per initial plan/protocol at a frequency of  2-3 x per week for 4 weeks  []Continue therapy with the following recommended changes:_____________________      _____________________________________________________________________  []Discontinue therapy progressing towards or have reached established goals  []Discontinue therapy due to lack of appreciable progress towards goals  []Discontinue therapy due to lack of attendance or compliance  []Await Physician's recommendations/decisions regarding therapy  []Other:________________________________________________________________    Thank you for this referral.   Ramesh Olvera, PTA 1/26/2023 6:26 AM    NOTE TO PHYSICIAN:  Keke Marshall [id number]   FAX TO [facility]: [phone]  If you are unable to process this request in 24 hours please contact our office: [phone]    I have read the above report and request that my patient continue as recommended. I have read the above report and request that my patient continue therapy with the following changes/special instructions:____________________________________  I have read the above report and request that my patient be discharged from therapy.     Physicians signature: ______________________________Date: ______Time:______     Tessy Blandon NP

## 2023-01-26 NOTE — PROGRESS NOTES
PT DAILY TREATMENT NOTE     Patient Name: Vishal Carrillo  Date:2023  : 1980  [x]  Patient  Verified  Payor: Duane Soto / Plan: [employer] / Product Type: HMO /    In time:1000  Out time:1027  Total Treatment Time (min): 27  Visit #: 7 of 12    Treatment Area: Weakness of left lower extremity [R29.898]    SUBJECTIVE  Pain Level (0-10 scale): 0/10  Any medication changes, allergies to medications, adverse drug reactions, diagnosis change, or new procedure performed?: [x] No    [] Yes (see summary sheet for update)  Subjective functional status/changes:   [] No changes reported  Pt stated that she is not sure if she is just getting used to the pain or not, but stated that she has no pain right now    OBJECTIVE    17 min Therapeutic Exercise:  [x] See flow sheet :   Rationale: increase ROM, increase strength, improve coordination, improve balance, and increase proprioception to improve the patients ability to ambulate and perform daily tasks with increased ease     10 min Therapeutic Activity:  [x]  See flow sheet : FOTO and goal assessment   Rationale: increase ROM, increase strength, improve coordination, improve balance, and increase proprioception  to improve the patients ability to ambulate and perform daily tasks with increased ease          With   [x] TE   [] TA   [] neuro   [] other: Patient Education: [x] Review HEP    [] Progressed/Changed HEP based on:   [] positioning   [] body mechanics   [] transfers   [] heat/ice application    [] other:      Other Objective/Functional Measures:   FOTO 54/100     Pain Level (0-10 scale) post treatment: 0/10    ASSESSMENT/Changes in Function:   See progress note    Patient will continue to benefit from skilled PT services to modify and progress therapeutic interventions, address functional mobility deficits, address ROM deficits, address strength deficits, analyze and address soft tissue restrictions, analyze and cue movement patterns, analyze and modify body mechanics/ergonomics, assess and modify postural abnormalities, address imbalance/dizziness, and instruct in home and community integration to attain remaining goals. []  See Plan of Care  [x]  See progress note/recertification  []  See Discharge Summary         Progress towards goals / Updated goals:  Short Term Goals: To be accomplished in 1 weeks:  1. Patient will report compliance with initial HEP to optimize therapy outcomes. Status at eval:established  Current: compliant (1/11/23)     Long Term Goals: To be accomplished in 6 weeks:  1. Patient will improve FOTO score by at least 5 points in order to demonstrate functional improvement. Status at eval: 49/100  Goal met. Increased from 49/100 to 54/100    2. Patient will report no more than \"little difficulty\" with \"walking a mile\" with FOTO in order to demonstrate improved tolerance to daily tasks. Status at eval: \"quite a bit of difficulty\"  Current Status:  Progressing. Pt reports she could walk a mile but it would be very because she would have to concentrate on her foot placement 1/16/23     3. Patient will improve left ankle AROM to at least 5 deg DF and 20 deg inversion in order to ambulate with increased ease. Status at eval: 0 deg DF, 10 deg inversion  Progressing. Left ankle AROM for DF is 0* and inversion is 15*    4. Patient will improve left ankle strength to at least 4-/5 with MMT (in available range) in order to perform functional tasks with increased ease. Status at eval:     Left  Right Left   Dorsiflexion 1+ 4+ 1+   Plantarflexion 5 4+ 5   Inversion 2+ 4 3   Eversion 3 4+ 3      5. Patient will be able to maintain tandem stance for at least 20 sec B in order to perform self care tasks with increased ease. Status at eval: left posterior 4 sec, right posterior 11 sec                 Current Status: Goal met.   30 seconds B 1/16/23    PLAN  []  Upgrade activities as tolerated     [x] Continue plan of care  []  Update interventions per flow sheet       []  Discharge due to:_  []  Other:_      Molly Armas, [facility] 1/26/2023  6:24 AM    Future Appointments   Date Time Provider Kiana Mccrary   1/26/2023 10:00 AM Melony Nelson, [facility]   1/30/2023 10:00 AM Luis Cantu, PT [facility]   2/1/2023 10:00 AM Haylee Powers, PTA [facility]   2/6/2023 10:00 AM Luis Cantu, PT [facility]   2/8/2023 10:00 AM Haylee Powers, PTA [facility]   2/13/2023 10:00 AM Luis Cantu, PT [facility]   2/15/2023 10:00 AM Haylee Powers, PTA [facility]

## 2023-01-30 ENCOUNTER — APPOINTMENT (OUTPATIENT)
Dept: PHYSICAL THERAPY | Age: 43
End: 2023-01-30
Payer: COMMERCIAL

## 2023-01-30 NOTE — PROGRESS NOTES
PT DAILY TREATMENT NOTE     Patient Name: Molly Jay  ILQH:318  : 1980  [x]  Patient  Verified  Payor: Matilde Erp / Plan: [employer] / Product Type: HMO /    In time:***  Out time:***  Total Treatment Time (min): ***  Visit #: 2 of 12    Medicare/BCBS Only   Total Timed Codes (min):  *** 1:1 Treatment Time:  ***       Treatment Area: Weakness of left lower extremity [R29.898]    SUBJECTIVE  Pain Level (0-10 scale): ***  Any medication changes, allergies to medications, adverse drug reactions, diagnosis change, or new procedure performed?: [x] No    [] Yes (see summary sheet for update)  Subjective functional status/changes:   [] No changes reported  ***      AFO    OBJECTIVE    Modality rationale: {BSI INMOTION MODALITIES:60833} to improve the patients ability to ***   Min Type Additional Details    [] Estim:  []Unatt       []IFC  []Premod                        []Other:  []w/ice   []w/heat  Position:  Location:    [] Estim: []Att    []TENS instruct  []NMES                    []Other:  []w/US   []w/ice   []w/heat  Position:  Location:    []  Traction: [] Cervical       []Lumbar                       [] Prone          []Supine                       []Intermittent   []Continuous Lbs:  [] before manual  [] after manual    []  Ultrasound: []Continuous   [] Pulsed                           []1MHz   []3MHz W/cm2:  Location:    []  Iontophoresis with dexamethasone         Location: [] Take home patch   [] In clinic    []  Ice     []  heat  []  Ice massage  []  Laser   []  Anodyne Position:  Location:    []  Laser with stim  []  Other:  Position:  Location:    []  Vasopneumatic Device    []  Right     []  Left  Pre-treatment girth:  Post-treatment girth:  Measured at (location):  Pressure:       [] lo [] med [] hi   Temperature: [] lo [] med [] hi   [] Skin assessment post-treatment:  []intact []redness- no adverse reaction    []redness - adverse reaction:     *** min []Eval []Re-Eval       *** min Therapeutic Exercise:  [] See flow sheet :   Rationale: {BSHSI IMMOTION THER EX:20657:a} to improve the patients ability to ***    *** min Therapeutic Activity:  []  See flow sheet :   Rationale: {BSHSI IMMOTION THER EX:19333:a}  to improve the patients ability to ***     *** min Neuromuscular Re-education:  []  See flow sheet :   Rationale: {BSHSI IMMOTION THER EX:01130:a}  to improve the patients ability to ***    *** min Manual Therapy:  ***   The manual therapy interventions were performed at a separate and distinct time from the therapeutic activities interventions. Rationale: {[facility]99698:a} to ***    *** min Gait Training:  ___ feet with ___ device on level surfaces with ___ level of assist   Rationale:    *** min Self Care/Home Management: ***   Rationale: {BSHSI IMMOTION THER EX:81636:a}  to improve the patients ability to ***          With   [] TE   [] TA   [] neuro   [] other: Patient Education: [x] Review HEP    [] Progressed/Changed HEP based on:   [] positioning   [] body mechanics   [] transfers   [] heat/ice application    [] other:      Other Objective/Functional Measures: ***     Pain Level (0-10 scale) post treatment: ***    ASSESSMENT/Changes in Function: ***    Patient will continue to benefit from skilled PT services to [facility]44324:a} to attain remaining goals. []  See Plan of Care  []  See progress note/recertification  []  See Discharge Summary         Progress towards goals / Updated goals:          1. Patient will improve FOTO score to at least 70/100 points in order to demonstrate functional improvement. Status at eval: 54/100  2. Patient will report no more than \"little difficulty\" with \"walking a mile\" with FOTO in order to demonstrate improved tolerance to daily tasks. Status at eval: \"moderate difficulty\"  3.  Patient will improve left ankle AROM to at least 5 deg DF and 20 deg inversion in order to ambulate with increased ease. Status at eval: 0 deg DF, 15 deg inversion  4. Patient will improve left ankle strength to at least 4-/5 with MMT (in available range) in order to perform functional tasks with increased ease.                Status at eval:     Left  Right Left   Dorsiflexion 1+ 4+ 1+   Plantarflexion 5 4+ 5   Inversion 2+ 4 3   Eversion 3 4+ 3          PLAN  []  Upgrade activities as tolerated     []  Continue plan of care  []  Update interventions per flow sheet       []  Discharge due to:_  []  Other:_      Jean Pierre Bacon, PT 1/30/2023  8:36 AM    Future Appointments   Date Time Provider Kiana Mccrary   1/30/2023 10:00 AM Tierra Astorga [facility]   2/1/2023 10:00 AM Abraham Albert, [facility]   2/6/2023 10:00 AM Luis Lay, PT [facility]   2/8/2023 10:00 AM Abraham Albert, PTA [facility]   2/13/2023 10:00 AM Luis Lay, PT [facility]   2/15/2023 10:00 AM Abraham Albert, [facility]

## 2023-02-01 ENCOUNTER — HOSPITAL ENCOUNTER (OUTPATIENT)
Dept: PHYSICAL THERAPY | Age: 43
Discharge: HOME | End: 2023-02-01
Payer: COMMERCIAL

## 2023-02-01 PROCEDURE — 97110 THERAPEUTIC EXERCISES: CPT

## 2023-02-01 PROCEDURE — 97140 MANUAL THERAPY 1/> REGIONS: CPT

## 2023-02-01 NOTE — PROGRESS NOTES
PT DAILY TREATMENT NOTE     Patient Name: Hernan Steward  IEZZ:[birth year]11  : 1980  [x]  Patient  Verified  Payor: Mariela House / Plan: [employer] / Product Type: HMO /    In time: 10:05  Out time: 10:43  Total Treatment Time (min): 38  Visit #: 1 of 12    Treatment Area: Weakness of left lower extremity [R29.898]    SUBJECTIVE  Pain Level (0-10 scale): 0/10  Any medication changes, allergies to medications, adverse drug reactions, diagnosis change, or new procedure performed?: [x] No    [] Yes (see summary sheet for update)  Subjective functional status/changes:   [] No changes reported  Pt reports no real pain but tenderness she has been feeling across the top of the foot. She thought with everything healed she shouldn't feel pain there. The EMG is 23 and she is getting a MRI too on 23.        OBJECTIVE    13 min Therapeutic Exercise:  [x] See flow sheet :   Rationale: increase ROM, increase strength, improve coordination, improve balance, and increase proprioception to improve the patients ability to ambulate and perform daily tasks with increased ease     15 min Manual Therapy:  [x]  See flow sheet : forefoot mobs, hindfoot mobs, grade III-IV; STM left lower leg    Rationale: increase ROM, increase strength, improve coordination, improve balance, and increase proprioception  to improve the patients ability to ambulate and perform daily tasks with increased ease    10 minutes of heat to anterior shin muscles and ice to dorsal aspect of foot in hooklying to reduce tenderness felt to left lower leg          With   [x] TE   [] TA   [] neuro   [] other: Patient Education: [x] Review HEP    [] Progressed/Changed HEP based on:   [] positioning   [] body mechanics   [] transfers   [] heat/ice application    [] other:      Other Objective/Functional Measures:   Reviewed exercises she is performing at home  Educated elliptical is fine to use at gym since foot is planted  TTP left lower leg anterior along extensor group but pt pleased with manual    Pain Level (0-10 scale) post treatment: 0/10    ASSESSMENT/Changes in Function: Pt with slight improved noted during ambulation but continues to lack tibialis anterior activation. She has tenderness along the left lower leg in the extensor muscle group likely compensation for lack of DF. She is compliant with her HEP and trying to workout more at the gym. Will continue to progress as able and await further diagnostic testing. Patient will continue to benefit from skilled PT services to modify and progress therapeutic interventions, address functional mobility deficits, address ROM deficits, address strength deficits, analyze and address soft tissue restrictions, analyze and cue movement patterns, analyze and modify body mechanics/ergonomics, assess and modify postural abnormalities, address imbalance/dizziness, and instruct in home and community integration to attain remaining goals. [x]  See Plan of Care  [x]  See progress note/recertification  []  See Discharge Summary         Updated Goals: to be achieved in 4 weeks:              1. Patient will improve FOTO score to at least 70/100 points in order to demonstrate functional improvement. Status at eval: 54/100  2. Patient will report no more than \"little difficulty\" with \"walking a mile\" with FOTO in order to demonstrate improved tolerance to daily tasks. Status at eval: \"moderate difficulty\"  3. Patient will improve left ankle AROM to at least 5 deg DF and 20 deg inversion in order to ambulate with increased ease. Status at eval: 0 deg DF, 15 deg inversion  4. Patient will improve left ankle strength to at least 4-/5 with MMT (in available range) in order to perform functional tasks with increased ease.                Status at eval:     Left  Right Left   Dorsiflexion 1+ 4+ 1+   Plantarflexion 5 4+ 5   Inversion 2+ 4 3   Eversion 3 4+ 3       PLAN  [x]  Upgrade activities as tolerated [x]  Continue plan of care  []  Update interventions per flow sheet       []  Discharge due to:_  []  Other:_      Ai Mars, [facility] 2/1/2023  6:24 AM    Future Appointments   Date Time Provider Kiana Mccrary   2/1/2023 10:00 AM Ana Lilia Masters PTA [facility]   2/2/2023 11:30 AM Luis Bradshaw, PT [facility]   2/6/2023 10:00 AM Luis Bradshaw, [place] [facility]   2/8/2023 10:00 AM Ana Lilia Masters PTA [facility]   2/13/2023 10:00 AM Luis Bradshaw, PT [facility]   2/15/2023 10:00 AM Ana Lilia Masters PTA [facility]

## 2023-02-02 ENCOUNTER — HOSPITAL ENCOUNTER (OUTPATIENT)
Dept: PHYSICAL THERAPY | Age: 43
Discharge: HOME | End: 2023-02-02
Payer: COMMERCIAL

## 2023-02-02 PROCEDURE — 97110 THERAPEUTIC EXERCISES: CPT

## 2023-02-02 PROCEDURE — 97530 THERAPEUTIC ACTIVITIES: CPT

## 2023-02-02 NOTE — PROGRESS NOTES
PT DAILY TREATMENT NOTE     Patient Name: Sabas Bethea  WNAO:3/1/[birth year]23  : 1980  [x]  Patient  Verified  Payor: Felisa Garza / Plan: [employer] / Product Type: HMO /    In time: 11:30  Out time: 12:00  Total Treatment Time (min): 30  Visit #: 2 of 12    Treatment Area: Weakness of left lower extremity [R29.898]    SUBJECTIVE  Pain Level (0-10 scale): 1/10 dorsal aspect of left foot  Any medication changes, allergies to medications, adverse drug reactions, diagnosis change, or new procedure performed?: [x] No    [] Yes (see summary sheet for update)  Subjective functional status/changes:   [] No changes reported  Pt reports pain is in the top of her foot. OBJECTIVE    20 min Therapeutic Exercise:  [x] See flow sheet :   Rationale: increase ROM, increase strength, improve coordination, improve balance, and increase proprioception to improve the patients ability to ambulate and perform daily tasks with increased ease    10 min Therapeutic Activity:  [x] See flow sheet : squats and bridges   Rationale: increase ROM, increase strength, improve coordination, improve balance, and increase proprioception to improve the patients ability to ambulate and perform daily tasks with increased ease          With   [x] TE   [] TA   [] neuro   [] other: Patient Education: [x] Review HEP    [] Progressed/Changed HEP based on:   [] positioning   [] body mechanics   [] transfers   [] heat/ice application    [] other:      Other Objective/Functional Measures:   - no AROM with left ankle EV and DF without compensation  - no LOBs with static balance, moderate ankle strategy    Pain Level (0-10 scale) post treatment: 0/10    ASSESSMENT/Changes in Function:   Pt tolerated session well with good effort however continues to lack EV and DF AROM. Required cuing initially to increase left LE weightbearing with bridges for even lift.  Required TCs for sidelying hip abduction to prevent posterior roll, especially when performing with right LE. Patient will continue to benefit from skilled PT services to modify and progress therapeutic interventions, address functional mobility deficits, address ROM deficits, address strength deficits, analyze and address soft tissue restrictions, analyze and cue movement patterns, analyze and modify body mechanics/ergonomics, assess and modify postural abnormalities, address imbalance/dizziness, and instruct in home and community integration to attain remaining goals. [x]  See Plan of Care  [x]  See progress note/recertification  []  See Discharge Summary         Updated Goals: to be achieved in 4 weeks:              1. Patient will improve FOTO score to at least 70/100 points in order to demonstrate functional improvement. Status at eval: 54/100  2. Patient will report no more than \"little difficulty\" with \"walking a mile\" with FOTO in order to demonstrate improved tolerance to daily tasks. Status at eval: \"moderate difficulty\"  3. Patient will improve left ankle AROM to at least 5 deg DF and 20 deg inversion in order to ambulate with increased ease. Status at eval: 0 deg DF, 15 deg inversion  4. Patient will improve left ankle strength to at least 4-/5 with MMT (in available range) in order to perform functional tasks with increased ease.                Status at eval:     Left  Right Left   Dorsiflexion 1+ 4+ 1+   Plantarflexion 5 4+ 5   Inversion 2+ 4 3   Eversion 3 4+ 3       PLAN  [x]  Upgrade activities as tolerated     [x]  Continue plan of care  []  Update interventions per flow sheet       []  Discharge due to:_  []  Other:_      Gina Streeter PTA 2/2/2023  12:00 PM    Future Appointments   Date Time Provider Kiana Mccrary   2/2/2023 11:30 AM Yanira Leija PTA [facility]   2/6/2023 10:00 AM Luis Braswell, PT [facility]   2/8/2023 10:00 AM Chuy Schrader PTA [facility]   2/13/2023 10:00 AM Luis Braswell, PT [facility]   2/15/2023 10:00 AM Mirza Mei, PTA [facility]

## 2023-02-06 ENCOUNTER — APPOINTMENT (OUTPATIENT)
Dept: PHYSICAL THERAPY | Age: 43
End: 2023-02-06
Payer: COMMERCIAL

## 2023-02-08 ENCOUNTER — HOSPITAL ENCOUNTER (OUTPATIENT)
Dept: PHYSICAL THERAPY | Age: 43
Discharge: HOME | End: 2023-02-08
Payer: COMMERCIAL

## 2023-02-08 PROCEDURE — 97110 THERAPEUTIC EXERCISES: CPT

## 2023-02-08 NOTE — PROGRESS NOTES
PT DAILY TREATMENT NOTE     Patient Name: Refugio Carl  GKNC:5/3/7649  : 1980  [x]  Patient  Verified  Payor: Shelly Maya / Plan: [facility] / Product Type: HMO /    In time: 10:05 Out time: 10:35  Total Treatment Time (min): 30  Visit #: 3 of 12    Treatment Area: Weakness of left lower extremity [R29.988]    SUBJECTIVE  Pain Level (0-10 scale): 1/10  Any medication changes, allergies to medications, adverse drug reactions, diagnosis change, or new procedure performed?: [x] No    [] Yes (see summary sheet for update)  Subjective functional status/changes:   [] No changes reported  Pt reports mild pain on the top of her left foot. She is just waiting to get her MRI tomorrow and EMG coming up. She was able to do the elliptical for 40 minutes and felt good getting her cardio in. OBJECTIVE    30 min Therapeutic Exercise:  [x] See flow sheet :   Rationale: increase ROM, increase strength, improve coordination, improve balance, and increase proprioception to improve the patients ability to ambulate and perform daily tasks with increased ease          With   [x] TE   [] TA   [] neuro   [] other: Patient Education: [x] Review HEP    [] Progressed/Changed HEP based on:   [] positioning   [] body mechanics   [] transfers   [] heat/ice application    [] other:      Other Objective/Functional Measures:   Easily progresses through exercises and needs updated HEP  Progressed hard card to advance balance challenges  Has black band at home  Genu valgus deformity bilaterally  Educated on importance of hip strengthening    Pain Level (0-10 scale) post treatment: 0/10    ASSESSMENT/Changes in Function: Pt progressing well with LE strengthening and progressing cardio independently at the gym on the bike and elliptical. She continues to demonstrate no return of tibialis anterior activation.  Will provide updated HEP next session and potentially place pt on hold soon if independent with HEP while awaiting further diagnostic testing for ongoing nerve symptoms. Patient will continue to benefit from skilled PT services to modify and progress therapeutic interventions, address functional mobility deficits, address ROM deficits, address strength deficits, analyze and address soft tissue restrictions, analyze and cue movement patterns, analyze and modify body mechanics/ergonomics, assess and modify postural abnormalities, address imbalance/dizziness, and instruct in home and community integration to attain remaining goals. [x]  See Plan of Care  [x]  See progress note/recertification  []  See Discharge Summary         Updated Goals: to be achieved in 4 weeks:              1. Patient will improve FOTO score to at least 70/100 points in order to demonstrate functional improvement. Status at eval: 54/100  2. Patient will report no more than \"little difficulty\" with \"walking a mile\" with FOTO in order to demonstrate improved tolerance to daily tasks. Status at eval: \"moderate difficulty\"  3. Patient will improve left ankle AROM to at least 5 deg DF and 20 deg inversion in order to ambulate with increased ease. Status at eval: 0 deg DF, 15 deg inversion  4. Patient will improve left ankle strength to at least 4-/5 with MMT (in available range) in order to perform functional tasks with increased ease.                Status at eval:     Left  Right Left   Dorsiflexion 1+ 4+ 1+   Plantarflexion 5 4+ 5   Inversion 2+ 4 3   Eversion 3 4+ 3       PLAN  [x]  Upgrade activities as tolerated     [x]  Continue plan of care  []  Update interventions per flow sheet       []  Discharge due to:_  []  Other:_      Jagdeep Morales PTA 2/8/2023  12:00 PM    Future Appointments   Date Time Provider Kiana Mccrary   2/8/2023 10:00 AM Sebas Auguste PTA [facility]   2/13/2023 10:00 AM Luis Fuentes, PT [facility]   2/15/2023 10:00 AM Sebsa Auguste PTA [facility]

## 2023-02-13 ENCOUNTER — APPOINTMENT (OUTPATIENT)
Facility: HOSPITAL | Age: 43
End: 2023-02-13
Payer: COMMERCIAL

## 2023-02-13 ENCOUNTER — APPOINTMENT (OUTPATIENT)
Dept: PHYSICAL THERAPY | Age: 43
End: 2023-02-13
Payer: COMMERCIAL

## 2023-02-15 ENCOUNTER — APPOINTMENT (OUTPATIENT)
Dept: PHYSICAL THERAPY | Age: 43
End: 2023-02-15
Payer: COMMERCIAL

## 2023-03-10 ENCOUNTER — OFFICE VISIT (OUTPATIENT)
Age: 43
End: 2023-03-10

## 2023-03-10 VITALS
TEMPERATURE: 97.7 F | SYSTOLIC BLOOD PRESSURE: 154 MMHG | RESPIRATION RATE: 16 BRPM | HEART RATE: 88 BPM | DIASTOLIC BLOOD PRESSURE: 94 MMHG | WEIGHT: 266.8 LBS | OXYGEN SATURATION: 100 % | BODY MASS INDEX: 44.45 KG/M2 | HEIGHT: 65 IN

## 2023-03-10 DIAGNOSIS — Z11.4 SCREENING FOR HIV WITHOUT PRESENCE OF RISK FACTORS: ICD-10-CM

## 2023-03-10 DIAGNOSIS — Z11.59 NEED FOR HEPATITIS C SCREENING TEST: ICD-10-CM

## 2023-03-10 DIAGNOSIS — E11.65 UNCONTROLLED TYPE 2 DIABETES MELLITUS WITH HYPERGLYCEMIA (HCC): ICD-10-CM

## 2023-03-10 DIAGNOSIS — E78.5 HYPERLIPIDEMIA, UNSPECIFIED HYPERLIPIDEMIA TYPE: ICD-10-CM

## 2023-03-10 DIAGNOSIS — Z00.00 WELL ADULT EXAM: Primary | ICD-10-CM

## 2023-03-10 DIAGNOSIS — Z23 ENCOUNTER FOR IMMUNIZATION: ICD-10-CM

## 2023-03-10 DIAGNOSIS — I10 PRIMARY HYPERTENSION: ICD-10-CM

## 2023-03-10 DIAGNOSIS — E66.01 CLASS 3 SEVERE OBESITY DUE TO EXCESS CALORIES WITH SERIOUS COMORBIDITY AND BODY MASS INDEX (BMI) OF 40.0 TO 44.9 IN ADULT (HCC): ICD-10-CM

## 2023-03-10 RX ORDER — LANCETS 30 GAUGE
1 EACH MISCELLANEOUS 3 TIMES DAILY
Qty: 200 EACH | Refills: 0 | Status: SHIPPED | OUTPATIENT
Start: 2023-03-10

## 2023-03-10 RX ORDER — ATORVASTATIN CALCIUM 40 MG/1
40 TABLET, FILM COATED ORAL DAILY
Qty: 30 TABLET | Refills: 3 | Status: SHIPPED | OUTPATIENT
Start: 2023-03-10

## 2023-03-10 RX ORDER — GLUCOSAMINE HCL/CHONDROITIN SU 500-400 MG
CAPSULE ORAL
Qty: 200 STRIP | Refills: 2 | Status: SHIPPED | OUTPATIENT
Start: 2023-03-10

## 2023-03-10 RX ORDER — DILTIAZEM HYDROCHLORIDE 240 MG/1
CAPSULE, EXTENDED RELEASE ORAL DAILY
COMMUNITY
Start: 2018-02-05 | End: 2023-03-10 | Stop reason: ALTCHOICE

## 2023-03-10 RX ORDER — INSULIN GLARGINE 100 [IU]/ML
35 INJECTION, SOLUTION SUBCUTANEOUS DAILY
COMMUNITY
Start: 2018-02-05

## 2023-03-10 RX ORDER — AMLODIPINE BESYLATE AND BENAZEPRIL HYDROCHLORIDE 5; 10 MG/1; MG/1
1 CAPSULE ORAL DAILY
Qty: 30 CAPSULE | Refills: 2 | Status: SHIPPED | OUTPATIENT
Start: 2023-03-10

## 2023-03-10 SDOH — HEALTH STABILITY: PHYSICAL HEALTH: ON AVERAGE, HOW MANY MINUTES DO YOU ENGAGE IN EXERCISE AT THIS LEVEL?: 30 MIN

## 2023-03-10 SDOH — ECONOMIC STABILITY: HOUSING INSECURITY
IN THE LAST 12 MONTHS, WAS THERE A TIME WHEN YOU DID NOT HAVE A STEADY PLACE TO SLEEP OR SLEPT IN A SHELTER (INCLUDING NOW)?: NO

## 2023-03-10 SDOH — ECONOMIC STABILITY: INCOME INSECURITY: HOW HARD IS IT FOR YOU TO PAY FOR THE VERY BASICS LIKE FOOD, HOUSING, MEDICAL CARE, AND HEATING?: SOMEWHAT HARD

## 2023-03-10 SDOH — ECONOMIC STABILITY: FOOD INSECURITY: WITHIN THE PAST 12 MONTHS, THE FOOD YOU BOUGHT JUST DIDN'T LAST AND YOU DIDN'T HAVE MONEY TO GET MORE.: NEVER TRUE

## 2023-03-10 SDOH — HEALTH STABILITY: PHYSICAL HEALTH: ON AVERAGE, HOW MANY DAYS PER WEEK DO YOU ENGAGE IN MODERATE TO STRENUOUS EXERCISE (LIKE A BRISK WALK)?: 3 DAYS

## 2023-03-10 SDOH — ECONOMIC STABILITY: FOOD INSECURITY: WITHIN THE PAST 12 MONTHS, YOU WORRIED THAT YOUR FOOD WOULD RUN OUT BEFORE YOU GOT MONEY TO BUY MORE.: NEVER TRUE

## 2023-03-10 ASSESSMENT — ENCOUNTER SYMPTOMS
COUGH: 0
ABDOMINAL PAIN: 0
BACK PAIN: 0
EYE DISCHARGE: 0
TROUBLE SWALLOWING: 0
RHINORRHEA: 0
CONSTIPATION: 0
PHOTOPHOBIA: 0
NAUSEA: 0
SHORTNESS OF BREATH: 0
VOICE CHANGE: 0
DIARRHEA: 0
SORE THROAT: 0
WHEEZING: 0
VOMITING: 0
EYE ITCHING: 0

## 2023-03-10 ASSESSMENT — SOCIAL DETERMINANTS OF HEALTH (SDOH)
WITHIN THE LAST YEAR, HAVE YOU BEEN HUMILIATED OR EMOTIONALLY ABUSED IN OTHER WAYS BY YOUR PARTNER OR EX-PARTNER?: NO
WITHIN THE LAST YEAR, HAVE YOU BEEN KICKED, HIT, SLAPPED, OR OTHERWISE PHYSICALLY HURT BY YOUR PARTNER OR EX-PARTNER?: NO
WITHIN THE LAST YEAR, HAVE YOU BEEN AFRAID OF YOUR PARTNER OR EX-PARTNER?: NO
WITHIN THE LAST YEAR, HAVE TO BEEN RAPED OR FORCED TO HAVE ANY KIND OF SEXUAL ACTIVITY BY YOUR PARTNER OR EX-PARTNER?: NO

## 2023-03-10 ASSESSMENT — PATIENT HEALTH QUESTIONNAIRE - PHQ9
SUM OF ALL RESPONSES TO PHQ QUESTIONS 1-9: 2
SUM OF ALL RESPONSES TO PHQ QUESTIONS 1-9: 2
SUM OF ALL RESPONSES TO PHQ9 QUESTIONS 1 & 2: 2
2. FEELING DOWN, DEPRESSED OR HOPELESS: 2
SUM OF ALL RESPONSES TO PHQ QUESTIONS 1-9: 2
1. LITTLE INTEREST OR PLEASURE IN DOING THINGS: 0
SUM OF ALL RESPONSES TO PHQ QUESTIONS 1-9: 2

## 2023-03-10 NOTE — PROGRESS NOTES
1. \"Have you been to the ER, urgent care clinic since your last visit? Hospitalized since your last visit? \" No    2. \"Have you seen or consulted any other health care providers outside of the [address] [place] [address] since your last visit? \" No     3. For patients aged 39-70: Has the patient had a colonoscopy / FIT/ Cologuard? NA - based on age      If the patient is female:    4. For patients aged 41-77: Has the patient had a mammogram within the past 2 years? No      5. For patients aged 21-65: Has the patient had a pap smear?  No

## 2023-03-10 NOTE — PROGRESS NOTES
History and Physical      Elisa Morales  YOB: 1980    Date of Service:  3/10/2023    Chief Complaint:   Ector Frazier is a 43 y.o. female who presents for complete physical examination. HPI: Pleasant 66-year-old female presenting today to establish care and for CPE. Patient has a past medical history of T2DM, HTN, HLD and morbid obesity. Patient expresses concern and worry about not having control over her health. She states she is currently trying to get her life back on track. Patient states she has been out of her medication for a while. Patient is currently employed as a  and works with the special needs population. She states, that she was previously a spin class instructor at [employer]. Patient this time denies any acute medical issues.     Wt Readings from Last 3 Encounters:   03/10/23 266 lb 12.8 oz (121 kg)     BP Readings from Last 3 Encounters:   03/10/23 (!) 154/94       Patient Active Problem List   Diagnosis    Hypertension    Acute pyelonephritis    Diabetes ([address]    Hypomagnesemia    ARDS (adult respiratory distress syndrome) (HCC)    PAF (paroxysmal atrial fibrillation) (HCC)    Morbid obesity (HCC)    SOB (shortness of breath)    Mitral regurgitation    Septic shock (HCC)    Hypokalemia       Preventive Care:  Health Maintenance   Topic Date Due    COVID-19 Vaccine (1) Never done    Varicella vaccine (1 of 2 - 2-dose childhood series) Never done    Lipids  Never done    HIV screen  Never done    Diabetic retinal exam  Never done    GFR test (Diabetes, CKD 3-4, OR last GFR 15-59)  Never done    Hepatitis C screen  Never done    Cervical cancer screen  Never done    Diabetic foot exam  02/18/2017    A1C test (Diabetic or Prediabetic)  02/05/2019    Diabetic Alb to Cr ratio (uACR) test  02/05/2019    Flu vaccine (1) 08/01/2022    Hepatitis B vaccine (2 of 2 - CpG risk 2-dose series) 04/07/2023    Depression Screen  03/10/2024    DTaP/Tdap/Td vaccine (2 - Td or Tdap) 12/05/2025    Pneumococcal 0-64 years Vaccine  Completed    Hepatitis A vaccine  Aged Out    Hib vaccine  Aged Out    Meningococcal (ACWY) vaccine  Aged Out      Hx abnormal PAP: no  Sexual activity: With spouse  Self-breast exams: yes  Previous DEXA scan: no  Last eye exam: 2022, normal  Exercise: 4 times per week  Seatbelt use: Yes  Lipid panel: No results found for: CHOL, TRIG, HDL, LDLCALC, LDLDIRECT     Living will:  no,   Patient declines ACP discussion/assistance    Immunization History   Administered Date(s) Administered    Hepatitis B Adult (Heplisav-b) 03/10/2023    Influenza Virus Vaccine 12/03/2015    Pneumococcal Polysaccharide (Uzilvcpvt15) 12/05/2015, 12/29/2015    Pneumococcal conjugate PCV20, PF (Prevnar 20) 03/10/2023    Tdap (Boostrix, Adacel) 12/05/2015       Allergies   Allergen Reactions    Seasonal      Other reaction(s): nasal symptoms     Outpatient Medications Marked as Taking for the 3/10/23 encounter (Office Visit) with General Ulysses, DO   Medication Sig Dispense Refill    atorvastatin (LIPITOR) 40 MG tablet Take 1 tablet by mouth daily 30 tablet 3    amLODIPine-benazepril (LOTREL) 5-10 MG per capsule Take 1 capsule by mouth daily 30 capsule 2    metFORMIN (GLUCOPHAGE) 500 MG tablet Take 1 tablet by mouth 2 times daily (with meals) 180 tablet 1    Dulaglutide 0.75 MG/0.5ML SOPN Inject 0.75 mg into the skin once a week 2 mL 2    insulin glargine (LANTUS SOLOSTAR) 100 UNIT/ML injection pen Inject 35 Units into the skin daily      blood glucose monitor kit and supplies Dispense sufficient amount for indicated testing frequency plus additional to accommodate PRN testing needs. Dispense all needed supplies to include: monitor, strips, lancing device, lancets, control solutions, alcohol swabs. 1 kit 0    blood glucose monitor strips Test 3 times a day & as needed for symptoms of irregular blood glucose.  Dispense sufficient amount for indicated testing frequency plus additional to accommodate PRN testing needs. 200 strip 2    Lancets MISC 1 each by Does not apply route 3 times daily 200 each 0       Past Medical History:   Diagnosis Date    Diabetes ([facility] [address]     Hypertension     Mitral regurgitation 2016    Neuropathy     Diagnosis of drop foot    Type 2 diabetes mellitus without complication [facility])      Past Surgical History:   Procedure Laterality Date     SECTION  12/04/15     Family History   Problem Relation Age of Onset    Diabetes Mother     Heart Attack Father 58        MI    Diabetes Other     Hypertension Other     Stroke Neg Hx     Cancer Neg Hx     Heart Disease Neg Hx      Social History     Socioeconomic History    Marital status:      Spouse name: Not on file    Number of children: Not on file    Years of education: Not on file    Highest education level: Not on file   Occupational History    Not on file   Tobacco Use    Smoking status: Never    Smokeless tobacco: Never   Vaping Use    Vaping Use: Never used   Substance and Sexual Activity    Alcohol use: Yes    Drug use: Yes     Types: Marijuana Estefany Wanda)    Sexual activity: Yes     Partners: Male     Birth control/protection: None     Comment:    Other Topics Concern    Not on file   Social History Narrative    Not on file     Social Determinants of Health     Financial Resource Strain: Medium Risk    Difficulty of Paying Living Expenses: Somewhat hard   Food Insecurity: No Food Insecurity    Worried About Running Out of Food in the Last Year: Never true    Ran Out of Food in the Last Year: Never true   Transportation Needs: Unknown    Lack of Transportation (Medical): Not on file    Lack of Transportation (Non-Medical):  No   Physical Activity: Insufficiently Active    Days of Exercise per Week: 3 days    Minutes of Exercise per Session: 30 min   Stress: Not on file   Social Connections: Not on file   Intimate Partner Violence: Not At Risk    Fear of Current or Ex-Partner: No    Emotionally Abused: No    Physically Abused: No    Sexually Abused: No   Housing Stability: Unknown    Unable to Pay for Housing in the Last Year: Not on file    Number of Places Lived in the Last Year: Not on file    Unstable Housing in the Last Year: No       Review of Systems:  Review of Systems   Constitutional:  Negative for activity change, appetite change, fatigue and fever. HENT:  Negative for congestion, hearing loss, postnasal drip, rhinorrhea, sore throat, trouble swallowing and voice change. Eyes:  Negative for photophobia, discharge, itching and visual disturbance. Respiratory:  Negative for cough, shortness of breath and wheezing. Cardiovascular:  Negative for chest pain, palpitations and leg swelling. Gastrointestinal:  Negative for abdominal pain, constipation, diarrhea, nausea and vomiting. Genitourinary:  Negative for difficulty urinating and dysuria. Musculoskeletal:  Negative for arthralgias and back pain. Skin:  Negative for rash. Neurological:  Negative for dizziness, weakness, light-headedness and headaches. Psychiatric/Behavioral:  Negative for sleep disturbance. The patient is not nervous/anxious. Physical Exam:   Vitals:    03/10/23 1401 03/10/23 1402   BP: (!) 166/102 (!) 154/94   Site: Left Upper Arm Left Upper Arm   Position: Sitting Sitting   Cuff Size: Large Adult Large Adult   Pulse: 88 88   Resp: 16    Temp: 97.7 °F (36.5 °C)    TempSrc: Temporal    SpO2: 100%    Weight: 266 lb 12.8 oz (121 kg)    Height: 5' 5\" (1.651 m)      Body mass index is 44.4 kg/m². Physical Exam  Constitutional:       General: She is not in acute distress. Appearance: Normal appearance. She is obese. She is not ill-appearing, toxic-appearing or diaphoretic. Comments: Morbidly obese   HENT:      Head: Normocephalic and atraumatic. Right Ear: Tympanic membrane, ear canal and external ear normal. There is no impacted cerumen.       Left Ear: Tympanic membrane, ear canal and external ear normal. There is no impacted cerumen. Nose: Nose normal. No congestion or rhinorrhea. Mouth/Throat:      Mouth: Mucous membranes are moist.      Pharynx: Oropharynx is clear. No oropharyngeal exudate or posterior oropharyngeal erythema. Eyes:      General: No scleral icterus. Right eye: No discharge. Left eye: No discharge. Extraocular Movements: Extraocular movements intact. Conjunctiva/sclera: Conjunctivae normal.      Pupils: Pupils are equal, round, and reactive to light. Cardiovascular:      Rate and Rhythm: Normal rate and regular rhythm. Pulses: Normal pulses. Heart sounds: Normal heart sounds. No murmur heard. No friction rub. No gallop. Pulmonary:      Effort: Pulmonary effort is normal.      Breath sounds: Normal breath sounds. No wheezing, rhonchi or rales. Abdominal:      General: Abdomen is flat. Bowel sounds are normal.      Palpations: Abdomen is soft. There is no mass. Tenderness: There is no abdominal tenderness. There is no right CVA tenderness or left CVA tenderness. Musculoskeletal:         General: Normal range of motion. Cervical back: Normal range of motion and neck supple. Right lower leg: No edema. Left lower leg: No edema. Lymphadenopathy:      Cervical: No cervical adenopathy. Skin:     General: Skin is warm. Neurological:      General: No focal deficit present. Mental Status: She is alert and oriented to person, place, and time. Psychiatric:         Mood and Affect: Mood normal.         Behavior: Behavior normal.       Assessment/Plan:    Shashi Villeda was seen today for annual exam.    Diagnoses and all orders for this visit:    Well adult exam  43 y.o. lady presenting today to establish care and for CPE. Patient at this time denies any acute medical issues and appears in stable health. -     CBC;  Future  -     CBC    Primary hypertension  Pleasant 43 y.o. female  with past medical history of hypertension currently not on any medication at this time.  Patient blood pressure is currently 154/94  Patient will be started on Lotrel 5-10 mg daily  Patient will return in 6 weeks for reassessment and nurse BP check in 2 weeks..  Counseled patient on the need for some modifications to diet and exercise.  DASH diet handout printed for patient.  Patient at this time denies any acute concerns and appears in stable health.  -     amLODIPine-benazepril (LOTREL) 5-10 MG per capsule; Take 1 capsule by mouth daily      Class 3 severe obesity due to excess calories with serious comorbidity and body mass index (BMI) of 40.0 to 44.9 in adult ([facility])  Patient's BMI is noted to be Body mass index is 44.4 kg/m².   It is recommended that you complete aerobic exercise 30-40 minutes a day, at least 3-5 days a week. Aerobic exercises are activities such as brisk walking, running/jogging, dancing, biking, swimming.  Patient has also been encouraged to join a gym  Healthy diet: Eat real food!  Avoid or minimize all processed food.   Patient has been encouraged to set a target weight loss of 1 to 2 pounds per week  Patient will be referred to weight management.  Plan is acceptable to patient.  -     Dulaglutide 0.75 MG/0.5ML SOPN; Inject 0.75 mg into the skin once a week  -     Ambulatory Referral to Weight Loss    Hyperlipidemia, unspecified hyperlipidemia type  Patient presented with a past medical history of hyperlipidemia  Most recent lipid panel of 7/9/2021 notable for total cholesterol 195, HDL 43 and LDL Calc 120 (target LDL Calc is less than 70)  Patient will be started on atorvastatin 40 mg daily  Patient counseled on benefits of lifestyle modifications through diet and exercise  Printout of recommendations for lifestyle modifications given to patient's.  -     atorvastatin (LIPITOR) 40 MG tablet; Take 1 tablet by mouth daily    Uncontrolled type 2 diabetes mellitus with hyperglycemia ([facility])  Pleasant 42 y.o. with a past  medical history of T2DM presenting today. Patient is currently not on any medication  Patient states blood sugar monitoring at home has been nonexistent. Her most recent A1c of 7/9/2021 notable at 13.4. Patient will be started on Trulicity 6.17 mg weekly and metformin 500 mg twice daily. Patient has been counseled on the strong possibility of beginning insulin supposed updated A1c  Encouraged to be consistent with her medications and lifestyle modifications through dietary changes and exercise. Patient has been encouraged to check her blood sugar twice daily and return in 6 weeks weeks with log of her readings. Patient has been advised to call back immediately concerns  -     Hemoglobin A1C [LAB90]; Future  -     Lipid Panel; Future  -     Microalbumin / creatinine urine ratio; Future  -     Cancel: Basic Metabolic Panel; Future  -     Comprehensive Metabolic Panel; Future  -     metFORMIN (GLUCOPHAGE) 500 MG tablet; Take 1 tablet by mouth 2 times daily (with meals)  -     Dulaglutide 0.75 MG/0.5ML SOPN; Inject 0.75 mg into the skin once a week  -     Comprehensive Metabolic Panel  -     Microalbumin / creatinine urine ratio  -     Lipid Panel  -     Hemoglobin A1C [LAB90]  -     blood glucose monitor kit and supplies; Dispense sufficient amount for indicated testing frequency plus additional to accommodate PRN testing needs. Dispense all needed supplies to include: monitor, strips, lancing device, lancets, control solutions, alcohol swabs. -     blood glucose monitor strips; Test 3 times a day & as needed for symptoms of irregular blood glucose. Dispense sufficient amount for indicated testing frequency plus additional to accommodate PRN testing needs. -     Lancets MISC; 1 each by Does not apply route 3 times daily    Screening for HIV without presence of risk factors  Comments:   Per care gap  Orders:  -     HIV Screen;  Future  -     HIV Screen  -     HIV 1/2 Ag/Ab, 4TH Generation,W Rflx Confirm; Future  -     HIV 1/2 Ag/Ab, 4TH Generation,W Rflx Confirm    Need for hepatitis C screening test  Comments:   Per care gap  Orders:  -     Hepatitis C Antibody; Future  -     Hepatitis C Antibody    Encounter for immunization  Comments:   Per care gap  Orders:  -     Pneumococcal, PCV20, PREVNAR 20, (age 25 yrs+), IM, PF  -     Hep B, HEPLISAV-B, (age 25 yrs+), IM, 0.5mL, 2-Dose    Return in about 6 weeks (around 4/21/2023) for HTN, T2DM, HLD, nurse BP check 2 weeks. This note was dictated utilizing voice recognition software which may lead to typographical errors. I apologize in advance if the situation occurs. If questions arise please do not hesitate to contact me or call our department.

## 2023-03-11 LAB
HIV -1/0/2 AG/AB WITH REFLEX: NON REACTIVE
HIV INTERPRETATION: NORMAL

## 2023-03-29 ENCOUNTER — NURSE ONLY (OUTPATIENT)
Age: 43
End: 2023-03-29

## 2023-03-29 VITALS — DIASTOLIC BLOOD PRESSURE: 90 MMHG | HEART RATE: 92 BPM | SYSTOLIC BLOOD PRESSURE: 128 MMHG

## 2023-03-29 DIAGNOSIS — I10 PRIMARY HYPERTENSION: Primary | ICD-10-CM

## 2023-03-29 RX ORDER — BLOOD PRESSURE TEST KIT
1 KIT MISCELLANEOUS DAILY
Qty: 1 KIT | Refills: 0 | Status: SHIPPED | OUTPATIENT
Start: 2023-03-29

## 2023-03-29 NOTE — PROGRESS NOTES
Patient in today for BP check only. Patient states she has taken medication Lotrel 5-10 mg today. Patient sat in quiet exam room for 10 minutes prior to BP reading. BP today is 134/97 with a heart rate of 94. Repeated patient BP after 10 minutes and reading is 128/90 with heart rate of 92. Dr Darian Medina was notified and patient was advised to increase medication starting tomorrow to 2 tablets and check her blood pressure. Patient voiced understanding. Patient left office before able to advise per Dr Darian Medina of what signs/ symptoms to watch for regarding low blood pressure. Attempted to call patient at number on file but no answer. Left a voicemail to return call to the office.

## 2023-03-29 NOTE — PROGRESS NOTES
Patient returned call and was advised of what signs/ symptoms she should be aware of after increasing her medication per Dr Day Alarcon. Patient voiced understanding.

## 2023-04-18 DIAGNOSIS — I10 PRIMARY HYPERTENSION: ICD-10-CM

## 2023-04-18 NOTE — TELEPHONE ENCOUNTER
Patient called states was advised by provider to increase dosage to take 2 pills daily instead of one and now running out of medication before can be refilled, requesting adjusting to prescription

## 2023-04-20 RX ORDER — AMLODIPINE BESYLATE AND BENAZEPRIL HYDROCHLORIDE 5; 10 MG/1; MG/1
1 CAPSULE ORAL DAILY
Qty: 30 CAPSULE | Refills: 2 | Status: SHIPPED | OUTPATIENT
Start: 2023-04-20

## 2023-04-24 ENCOUNTER — OFFICE VISIT (OUTPATIENT)
Age: 43
End: 2023-04-24
Payer: COMMERCIAL

## 2023-04-24 VITALS
SYSTOLIC BLOOD PRESSURE: 149 MMHG | RESPIRATION RATE: 14 BRPM | DIASTOLIC BLOOD PRESSURE: 96 MMHG | OXYGEN SATURATION: 100 % | HEART RATE: 86 BPM | HEIGHT: 65 IN | BODY MASS INDEX: 43.39 KG/M2 | WEIGHT: 260.4 LBS | TEMPERATURE: 97.5 F

## 2023-04-24 DIAGNOSIS — Z23 ENCOUNTER FOR IMMUNIZATION: ICD-10-CM

## 2023-04-24 DIAGNOSIS — E66.01 CLASS 3 SEVERE OBESITY DUE TO EXCESS CALORIES WITH SERIOUS COMORBIDITY AND BODY MASS INDEX (BMI) OF 40.0 TO 44.9 IN ADULT (HCC): ICD-10-CM

## 2023-04-24 DIAGNOSIS — E11.65 UNCONTROLLED TYPE 2 DIABETES MELLITUS WITH HYPERGLYCEMIA (HCC): ICD-10-CM

## 2023-04-24 DIAGNOSIS — I10 PRIMARY HYPERTENSION: Primary | ICD-10-CM

## 2023-04-24 PROCEDURE — 3044F HG A1C LEVEL LT 7.0%: CPT | Performed by: STUDENT IN AN ORGANIZED HEALTH CARE EDUCATION/TRAINING PROGRAM

## 2023-04-24 PROCEDURE — 99214 OFFICE O/P EST MOD 30 MIN: CPT | Performed by: STUDENT IN AN ORGANIZED HEALTH CARE EDUCATION/TRAINING PROGRAM

## 2023-04-24 PROCEDURE — 3080F DIAST BP >= 90 MM HG: CPT | Performed by: STUDENT IN AN ORGANIZED HEALTH CARE EDUCATION/TRAINING PROGRAM

## 2023-04-24 PROCEDURE — 3077F SYST BP >= 140 MM HG: CPT | Performed by: STUDENT IN AN ORGANIZED HEALTH CARE EDUCATION/TRAINING PROGRAM

## 2023-04-24 RX ORDER — AMLODIPINE BESYLATE AND BENAZEPRIL HYDROCHLORIDE 10; 20 MG/1; MG/1
1 CAPSULE ORAL DAILY
Qty: 30 CAPSULE | Refills: 3 | Status: SHIPPED | OUTPATIENT
Start: 2023-04-24

## 2023-04-24 ASSESSMENT — ENCOUNTER SYMPTOMS
VOMITING: 0
SORE THROAT: 0
EYE DISCHARGE: 0
WHEEZING: 0
SHORTNESS OF BREATH: 0
BACK PAIN: 0
DIARRHEA: 0
TROUBLE SWALLOWING: 0
PHOTOPHOBIA: 0
NAUSEA: 0
CONSTIPATION: 0
COUGH: 0
ABDOMINAL PAIN: 0
VOICE CHANGE: 0
EYE ITCHING: 0
RHINORRHEA: 0

## 2023-04-24 NOTE — PROGRESS NOTES
1. \"Have you been to the ER, urgent care clinic since your last visit? Hospitalized since your last visit? \" No    2. \"Have you seen or consulted any other health care providers outside of the [address] [place] [address] since your last visit? \" No     3. For patients aged 39-70: Has the patient had a colonoscopy / FIT/ Cologuard? NA - based on age      If the patient is female:    4. For patients aged 41-77: Has the patient had a mammogram within the past 2 years? No      5. For patients aged 21-65: Has the patient had a pap smear?  No

## 2023-04-24 NOTE — PROGRESS NOTES
Subjective:      Patient ID: Nia Michel is a 43 y.o. female. Pleasant 70-year-old female with a past medical history of HTN, T2DM, HLD, and morbid obesity presents today to discuss her chronic medical issues. Hypertension  This is a chronic problem. The current episode started more than 1 year ago. The problem has been waxing and waning since onset. The problem is uncontrolled. Pertinent negatives include no chest pain, headaches, palpitations or shortness of breath. Risk factors for coronary artery disease include obesity, dyslipidemia and diabetes mellitus. Past treatments include ACE inhibitors and calcium channel blockers. The current treatment provides mild improvement. Diabetes  She presents for her follow-up diabetic visit. She has type 2 diabetes mellitus. Her disease course has been improving. There are no hypoglycemic associated symptoms. Pertinent negatives for hypoglycemia include no dizziness, headaches or nervousness/anxiousness. There are no diabetic associated symptoms. Pertinent negatives for diabetes include no chest pain, no fatigue and no weakness. There are no hypoglycemic complications. Symptoms are stable. There are no diabetic complications. Risk factors for coronary artery disease include dyslipidemia, obesity, hypertension and diabetes mellitus. Current diabetic treatments: Metformin and Trulicity. Her weight is decreasing steadily. She is following a generally healthy diet. She participates in exercise three times a week. Her home blood glucose trend is decreasing steadily. (A.m. blood sugars: 90s 100s  P.m. blood sugars: 120s 130s) An ACE inhibitor/angiotensin II receptor blocker is being taken. Hyperlipidemia  This is a chronic problem. The current episode started more than 1 year ago. Exacerbating diseases include diabetes and obesity. Pertinent negatives include no chest pain or shortness of breath. Current antihyperlipidemic treatment includes statins.  Risk factors

## 2023-04-26 PROCEDURE — 90471 IMMUNIZATION ADMIN: CPT | Performed by: STUDENT IN AN ORGANIZED HEALTH CARE EDUCATION/TRAINING PROGRAM

## 2023-04-26 PROCEDURE — 90739 HEPB VACC 2/4 DOSE ADULT IM: CPT | Performed by: STUDENT IN AN ORGANIZED HEALTH CARE EDUCATION/TRAINING PROGRAM

## 2023-05-12 ENCOUNTER — OFFICE VISIT (OUTPATIENT)
Age: 43
End: 2023-05-12
Payer: COMMERCIAL

## 2023-05-12 ENCOUNTER — NURSE ONLY (OUTPATIENT)
Age: 43
End: 2023-05-12

## 2023-05-12 VITALS
TEMPERATURE: 97.5 F | DIASTOLIC BLOOD PRESSURE: 72 MMHG | RESPIRATION RATE: 17 BRPM | BODY MASS INDEX: 44.32 KG/M2 | SYSTOLIC BLOOD PRESSURE: 130 MMHG | HEIGHT: 65 IN | HEART RATE: 104 BPM | WEIGHT: 266 LBS | OXYGEN SATURATION: 98 %

## 2023-05-12 VITALS — DIASTOLIC BLOOD PRESSURE: 86 MMHG | SYSTOLIC BLOOD PRESSURE: 128 MMHG | HEART RATE: 87 BPM

## 2023-05-12 DIAGNOSIS — Z71.3 ENCOUNTER FOR DIETARY CONSULTATION: ICD-10-CM

## 2023-05-12 DIAGNOSIS — E66.01 CLASS 2 SEVERE OBESITY DUE TO EXCESS CALORIES WITH SERIOUS COMORBIDITY AND BODY MASS INDEX (BMI) OF 39.0 TO 39.9 IN ADULT (HCC): Primary | ICD-10-CM

## 2023-05-12 DIAGNOSIS — Z71.3 WEIGHT LOSS COUNSELING, ENCOUNTER FOR: ICD-10-CM

## 2023-05-12 DIAGNOSIS — Z01.30 BLOOD PRESSURE CHECK: Primary | ICD-10-CM

## 2023-05-12 PROCEDURE — 99203 OFFICE O/P NEW LOW 30 MIN: CPT | Performed by: NURSE PRACTITIONER

## 2023-05-12 PROCEDURE — 3074F SYST BP LT 130 MM HG: CPT | Performed by: NURSE PRACTITIONER

## 2023-05-12 PROCEDURE — 3078F DIAST BP <80 MM HG: CPT | Performed by: NURSE PRACTITIONER

## 2023-05-12 RX ORDER — SEMAGLUTIDE 0.25 MG/.5ML
0.25 INJECTION, SOLUTION SUBCUTANEOUS
Qty: 4 ML | Refills: 0 | Status: SHIPPED | OUTPATIENT
Start: 2023-05-12

## 2023-05-12 ASSESSMENT — ENCOUNTER SYMPTOMS
ALLERGIC/IMMUNOLOGIC NEGATIVE: 1
GASTROINTESTINAL NEGATIVE: 1
EYES NEGATIVE: 1
RESPIRATORY NEGATIVE: 1

## 2023-05-12 NOTE — PROGRESS NOTES
Patient was seen in the office today for a nurse blood pressure check. After sitting in the room calmly for 10 minutes the patient's blood pressure was taken. The blood pressure was taken on the left upper arm, in the sitting position, and with the large blood pressure cuff. The blood pressure reading was 128/86 with a heart rate of 87. Patient has been compliant with her medications. Patient also states she hasn't been able to get her trulicity due to the medication being on back order. I advised patient to contact near by pharmacies to see if they have the medication in stock then to call the office and left us know. Patient verbalized understanding. PCP notified.

## 2023-05-12 NOTE — PROGRESS NOTES
Weight Loss Progress Note: Initial Physician Visit      Karsten Casey is a 37 y.o. female with BMI 44.2 who is here for her initial evaluation for the medical weight loss medication program. Patient has tried phentermine in the past and has managed to lose 150lbs on her own through diet modification and exercise. She wants to lose weight now to get healthier. CC: Morbid Obesity    Weight History  Current weight 266lb   Goal weight 200lbs  Highest weight 386lbs   (See weight gain time line scanned into media section of chart)    Food intolerances (gas, bloating, diarrhea, vomiting)? None        Weight loss History  How many weight loss attempts have you had? A few attempts. Which program were you most successful doing? Significant Medical History    Have you ever taken appetite suppressants? Yes. If yes: Rx or OTC? Phentermine. If yes; Any negative side effects? No.    Ever diagnosed with sleep apnea or put on CPAP? No.    Ever had bariatric surgery?: No.    Pregnant or planning on becoming pregnant w/in 6 months: No.      Significant Psychosocial History   Any history of drug abuse or dependence: No.  Current Major Lifestyle Changes: Hypertension, Type II Diabetes, High Cholesterol, Neuropathy. Any potential unsupportive people: No.  Why are you starting a weight loss program now? Patient desires to lose weight and regain her mobility with the help of a MWL program since injuring her L foot in 09/2022. Are you ready? Yes. History of binge eating disorder or anorexia: No.  If yes, are you currently being treated? Social History  Social History     Tobacco Use    Smoking status: Never    Smokeless tobacco: Never   Substance Use Topics    Alcohol use: Yes     How many times a week do you eat out? Once a week or every other week. Do you drink any EtOH? Yes. If so, how much? Socially. Exercise  How many days a week do you currently exercise?  0 days.  Since fracturing L foot in 9/2022,

## 2023-05-18 ENCOUNTER — TELEPHONE (OUTPATIENT)
Age: 43
End: 2023-05-18

## 2023-05-22 ENCOUNTER — OFFICE VISIT (OUTPATIENT)
Age: 43
End: 2023-05-22
Payer: COMMERCIAL

## 2023-05-22 VITALS
OXYGEN SATURATION: 100 % | SYSTOLIC BLOOD PRESSURE: 130 MMHG | HEART RATE: 98 BPM | BODY MASS INDEX: 44.45 KG/M2 | TEMPERATURE: 97.9 F | DIASTOLIC BLOOD PRESSURE: 79 MMHG | WEIGHT: 266.8 LBS | RESPIRATION RATE: 20 BRPM | HEIGHT: 65 IN

## 2023-05-22 DIAGNOSIS — Z97.5 IUD (INTRAUTERINE DEVICE) IN PLACE: ICD-10-CM

## 2023-05-22 DIAGNOSIS — Z12.4 CERVICAL CANCER SCREENING: Primary | ICD-10-CM

## 2023-05-22 PROCEDURE — 3078F DIAST BP <80 MM HG: CPT | Performed by: NURSE PRACTITIONER

## 2023-05-22 PROCEDURE — 99213 OFFICE O/P EST LOW 20 MIN: CPT | Performed by: NURSE PRACTITIONER

## 2023-05-22 PROCEDURE — 3075F SYST BP GE 130 - 139MM HG: CPT | Performed by: NURSE PRACTITIONER

## 2023-05-22 ASSESSMENT — PATIENT HEALTH QUESTIONNAIRE - PHQ9
2. FEELING DOWN, DEPRESSED OR HOPELESS: 0
SUM OF ALL RESPONSES TO PHQ9 QUESTIONS 1 & 2: 0
SUM OF ALL RESPONSES TO PHQ QUESTIONS 1-9: 0
1. LITTLE INTEREST OR PLEASURE IN DOING THINGS: 0
SUM OF ALL RESPONSES TO PHQ QUESTIONS 1-9: 0

## 2023-05-22 NOTE — PROGRESS NOTES
1. \"Have you been to the ER, urgent care clinic since your last visit? Hospitalized since your last visit? \" No    2. \"Have you seen or consulted any other health care providers outside of the [address] [place] [address] since your last visit? \" No     3. For patients aged 39-70: Has the patient had a colonoscopy / FIT/ Cologuard? NA - based on age      If the patient is female:    4. For patients aged 41-77: Has the patient had a mammogram within the past 2 years? No      5. For patients aged 21-65: Has the patient had a pap smear? Yes - Care Gap present.  Most recent result on file

## 2023-05-22 NOTE — PROGRESS NOTES
Subjective: This 37 y.o. woman comes in today for pap smear only. Her most recent annual exam was on 3/10/2023. Her most recent Pap smear was on 2016 and showed no abnormalities. Previous abnormal Pap smears: no Contraception: IUD paraguard in place will ref to gynecology    Objective:     /79 (Site: Right Upper Arm, Position: Sitting, Cuff Size: Large Adult)   Pulse 98   Temp 97.9 °F (36.6 °C) (Temporal)   Resp 20   Ht 5' 5\" (1.651 m)   Wt 266 lb 12.8 oz (121 kg)   LMP 04/24/2023 (Approximate)   SpO2 100%   Breastfeeding No   BMI 44.40 kg/m²   Pelvic Exam: cervix normal in appearance, external genitalia normal, vagina normal without discharge. Pap smear obtained. Assessment:     Screening pap smear. Plan:     Follow up in 3 years, or as indicated by Pap results.

## 2023-05-23 ENCOUNTER — TELEPHONE (OUTPATIENT)
Age: 43
End: 2023-05-23

## 2023-05-24 DIAGNOSIS — I10 PRIMARY HYPERTENSION: ICD-10-CM

## 2023-05-24 DIAGNOSIS — E66.01 MORBID OBESITY (HCC): Primary | ICD-10-CM

## 2023-05-24 RX ORDER — AMLODIPINE BESYLATE AND BENAZEPRIL HYDROCHLORIDE 5; 10 MG/1; MG/1
CAPSULE ORAL
Qty: 30 CAPSULE | Refills: 2 | OUTPATIENT
Start: 2023-05-24

## 2023-05-24 NOTE — PROGRESS NOTES
Called and spoke to patient who advised that since Mary Geiger is not covered by her insurance that she would like to try the Contrave. Advised would send prescription to [facility] and she can contact them in an hour or so to initiate payment.

## 2023-05-30 LAB
CHLAMYDIA TRACHOMATIS THINPREP: NEGATIVE
HPV DNA HIGH RISK: NEGATIVE
HPV GENOTYPE: NEGATIVE
HPV, GENOTYPE 18: NEGATIVE
NEISSERIA GONORRHOEAE THINPREP: NEGATIVE
PAP IMAGE GUIDED: NORMAL
TRICHOMONAS NUC AMP-THIN PREP: NEGATIVE

## 2023-07-04 DIAGNOSIS — E78.5 HYPERLIPIDEMIA, UNSPECIFIED HYPERLIPIDEMIA TYPE: ICD-10-CM

## 2023-07-05 NOTE — TELEPHONE ENCOUNTER
Last Appointment 4/24/2023  Next Appointment NONE      Requested Prescriptions     Pending Prescriptions Disp Refills    atorvastatin (LIPITOR) 40 MG tablet [Pharmacy Med Name: ATORVASTATIN 40 MG TABLET] 30 tablet 3     Sig: take 1 tablet by mouth once daily

## 2023-07-10 RX ORDER — ATORVASTATIN CALCIUM 40 MG/1
TABLET, FILM COATED ORAL
Qty: 30 TABLET | Refills: 0 | Status: SHIPPED | OUTPATIENT
Start: 2023-07-10 | End: 2023-08-05

## 2023-08-04 DIAGNOSIS — E78.5 HYPERLIPIDEMIA, UNSPECIFIED HYPERLIPIDEMIA TYPE: ICD-10-CM

## 2023-08-05 RX ORDER — ATORVASTATIN CALCIUM 40 MG/1
TABLET, FILM COATED ORAL
Qty: 30 TABLET | Refills: 3 | Status: SHIPPED | OUTPATIENT
Start: 2023-08-05

## 2023-08-30 DIAGNOSIS — I10 PRIMARY HYPERTENSION: ICD-10-CM

## 2023-08-31 NOTE — TELEPHONE ENCOUNTER
Last appointment 5/22/2023  Next appointment not scheduled       Last written 4/24/2024 #30 3 refills

## 2023-09-01 RX ORDER — AMLODIPINE BESYLATE AND BENAZEPRIL HYDROCHLORIDE 10; 20 MG/1; MG/1
CAPSULE ORAL
Qty: 30 CAPSULE | Refills: 3 | Status: SHIPPED | OUTPATIENT
Start: 2023-09-01

## 2023-09-02 DIAGNOSIS — E11.65 UNCONTROLLED TYPE 2 DIABETES MELLITUS WITH HYPERGLYCEMIA (HCC): ICD-10-CM

## 2023-09-05 NOTE — TELEPHONE ENCOUNTER
Last appointment 5/22/2023  Next appointment not scheduled      Last written   3/10/2023 90 1 refill

## 2023-10-20 ENCOUNTER — OFFICE VISIT (OUTPATIENT)
Age: 43
End: 2023-10-20
Payer: COMMERCIAL

## 2023-10-20 VITALS
HEIGHT: 65 IN | BODY MASS INDEX: 48.48 KG/M2 | OXYGEN SATURATION: 98 % | HEART RATE: 76 BPM | SYSTOLIC BLOOD PRESSURE: 124 MMHG | TEMPERATURE: 97.7 F | WEIGHT: 291 LBS | DIASTOLIC BLOOD PRESSURE: 80 MMHG | RESPIRATION RATE: 20 BRPM

## 2023-10-20 DIAGNOSIS — I10 PRIMARY HYPERTENSION: ICD-10-CM

## 2023-10-20 DIAGNOSIS — E11.9 TYPE 2 DIABETES MELLITUS WITHOUT COMPLICATION, WITHOUT LONG-TERM CURRENT USE OF INSULIN (HCC): Primary | ICD-10-CM

## 2023-10-20 DIAGNOSIS — E66.01 CLASS 3 SEVERE OBESITY DUE TO EXCESS CALORIES WITH SERIOUS COMORBIDITY AND BODY MASS INDEX (BMI) OF 40.0 TO 44.9 IN ADULT (HCC): ICD-10-CM

## 2023-10-20 PROCEDURE — 99214 OFFICE O/P EST MOD 30 MIN: CPT | Performed by: STUDENT IN AN ORGANIZED HEALTH CARE EDUCATION/TRAINING PROGRAM

## 2023-10-20 PROCEDURE — 3078F DIAST BP <80 MM HG: CPT | Performed by: STUDENT IN AN ORGANIZED HEALTH CARE EDUCATION/TRAINING PROGRAM

## 2023-10-20 PROCEDURE — 3044F HG A1C LEVEL LT 7.0%: CPT | Performed by: STUDENT IN AN ORGANIZED HEALTH CARE EDUCATION/TRAINING PROGRAM

## 2023-10-20 PROCEDURE — 3074F SYST BP LT 130 MM HG: CPT | Performed by: STUDENT IN AN ORGANIZED HEALTH CARE EDUCATION/TRAINING PROGRAM

## 2023-10-20 RX ORDER — AMLODIPINE BESYLATE AND BENAZEPRIL HYDROCHLORIDE 10; 20 MG/1; MG/1
1 CAPSULE ORAL DAILY
Qty: 30 CAPSULE | Refills: 2 | Status: SHIPPED | OUTPATIENT
Start: 2023-10-20

## 2023-10-20 RX ORDER — DULAGLUTIDE 0.75 MG/.5ML
INJECTION, SOLUTION SUBCUTANEOUS
COMMUNITY
Start: 2023-10-11 | End: 2023-10-20 | Stop reason: ALTCHOICE

## 2023-10-20 RX ORDER — GABAPENTIN 100 MG/1
100 CAPSULE ORAL 3 TIMES DAILY
COMMUNITY
Start: 2023-08-30

## 2023-10-20 RX ORDER — SEMAGLUTIDE 0.5 MG/.5ML
0.5 INJECTION, SOLUTION SUBCUTANEOUS
Qty: 2 ML | Refills: 0 | Status: SHIPPED | OUTPATIENT
Start: 2023-10-20 | End: 2023-11-19

## 2023-10-20 RX ORDER — SEMAGLUTIDE 1 MG/.5ML
1 INJECTION, SOLUTION SUBCUTANEOUS
Qty: 2 ML | Refills: 0 | Status: SHIPPED | OUTPATIENT
Start: 2023-11-19 | End: 2023-12-19

## 2023-10-20 RX ORDER — SEMAGLUTIDE 1.7 MG/.75ML
1.7 INJECTION, SOLUTION SUBCUTANEOUS
Qty: 3 ML | Refills: 0 | Status: SHIPPED | OUTPATIENT
Start: 2023-12-19 | End: 2024-01-18

## 2023-10-20 NOTE — PROGRESS NOTES
1. Have you been to the ER, urgent care clinic since your last visit? Hospitalized since your last visit? No    2. Have you seen or consulted any other health care providers outside of the [address] [place] [address] since your last visit? Include any pap smears or colon screening.  No

## 2023-10-20 NOTE — PATIENT INSTRUCTIONS
effective for you or anyone else. A healthcare professional should be consulted before taking any drug, changing any diet or commencing or discontinuing any course of treatment. The display and use of this drug information is subject to express Terms of Use. Care instructions adapted under license by [facility] ([facility]). If you have questions about a medical condition or this instruction, always ask your healthcare professional. [address] any warranty or liability for your use of this information.

## 2023-10-21 ASSESSMENT — ENCOUNTER SYMPTOMS
EYE ITCHING: 0
VOMITING: 0
BACK PAIN: 0
RHINORRHEA: 0
COUGH: 0
VOICE CHANGE: 0
EYE DISCHARGE: 0
SORE THROAT: 0
ABDOMINAL PAIN: 0
CONSTIPATION: 0
TROUBLE SWALLOWING: 0
SHORTNESS OF BREATH: 0
NAUSEA: 0
WHEEZING: 0
DIARRHEA: 0
PHOTOPHOBIA: 0

## 2023-10-23 ENCOUNTER — TELEPHONE (OUTPATIENT)
Age: 43
End: 2023-10-23

## 2023-10-24 NOTE — TELEPHONE ENCOUNTER
Spoke with the pharmacy. Wegovy 1.7mg/0.75ml is the only dosage available at this time. Would you like patient to begin with this one?

## 2023-10-31 ENCOUNTER — TELEPHONE (OUTPATIENT)
Age: 43
End: 2023-10-31

## 2023-10-31 NOTE — TELEPHONE ENCOUNTER
Pt called states has been advised that the Semaglutide is on backorder and will be for a few months, patient will contact insurance provider to verify alternative and contact office with info

## 2023-10-31 NOTE — TELEPHONE ENCOUNTER
Patient says she spoke with her insurance company and they will cover [address] [place] [address] with a prior authorization and chart notes.

## 2023-11-06 RX ORDER — DULAGLUTIDE 0.75 MG/.5ML
INJECTION, SOLUTION SUBCUTANEOUS
Qty: 2 ML | OUTPATIENT
Start: 2023-11-06

## 2023-11-09 ENCOUNTER — OFFICE VISIT (OUTPATIENT)
Age: 43
End: 2023-11-09
Payer: COMMERCIAL

## 2023-11-09 VITALS
OXYGEN SATURATION: 98 % | DIASTOLIC BLOOD PRESSURE: 70 MMHG | HEART RATE: 91 BPM | TEMPERATURE: 97.3 F | HEIGHT: 65 IN | RESPIRATION RATE: 20 BRPM | BODY MASS INDEX: 47.62 KG/M2 | SYSTOLIC BLOOD PRESSURE: 125 MMHG | WEIGHT: 285.8 LBS

## 2023-11-09 DIAGNOSIS — E66.01 CLASS 3 SEVERE OBESITY DUE TO EXCESS CALORIES WITH SERIOUS COMORBIDITY AND BODY MASS INDEX (BMI) OF 45.0 TO 49.9 IN ADULT (HCC): Primary | ICD-10-CM

## 2023-11-09 DIAGNOSIS — E78.5 HYPERLIPIDEMIA, UNSPECIFIED HYPERLIPIDEMIA TYPE: ICD-10-CM

## 2023-11-09 DIAGNOSIS — E11.9 TYPE 2 DIABETES MELLITUS WITHOUT COMPLICATION, WITHOUT LONG-TERM CURRENT USE OF INSULIN (HCC): ICD-10-CM

## 2023-11-09 DIAGNOSIS — I10 PRIMARY HYPERTENSION: ICD-10-CM

## 2023-11-09 PROCEDURE — 99214 OFFICE O/P EST MOD 30 MIN: CPT | Performed by: STUDENT IN AN ORGANIZED HEALTH CARE EDUCATION/TRAINING PROGRAM

## 2023-11-09 PROCEDURE — 3078F DIAST BP <80 MM HG: CPT | Performed by: STUDENT IN AN ORGANIZED HEALTH CARE EDUCATION/TRAINING PROGRAM

## 2023-11-09 PROCEDURE — 3074F SYST BP LT 130 MM HG: CPT | Performed by: STUDENT IN AN ORGANIZED HEALTH CARE EDUCATION/TRAINING PROGRAM

## 2023-11-09 PROCEDURE — 3044F HG A1C LEVEL LT 7.0%: CPT | Performed by: STUDENT IN AN ORGANIZED HEALTH CARE EDUCATION/TRAINING PROGRAM

## 2023-11-09 RX ORDER — PHENTERMINE HYDROCHLORIDE 37.5 MG/1
TABLET ORAL
COMMUNITY
Start: 2023-10-17

## 2023-11-09 RX ORDER — HYDROCHLOROTHIAZIDE 25 MG/1
25 TABLET ORAL DAILY
COMMUNITY
Start: 2023-10-17

## 2023-11-09 ASSESSMENT — ENCOUNTER SYMPTOMS
SHORTNESS OF BREATH: 0
ABDOMINAL PAIN: 0
RHINORRHEA: 0
WHEEZING: 0
TROUBLE SWALLOWING: 0
PHOTOPHOBIA: 0
BACK PAIN: 0
COUGH: 0
EYE DISCHARGE: 0
VOICE CHANGE: 0
NAUSEA: 0
DIARRHEA: 0
EYE ITCHING: 0
VOMITING: 0
CONSTIPATION: 0
SORE THROAT: 0

## 2023-11-09 NOTE — PATIENT INSTRUCTIONS
other diabetes medicine. Follow-up care is a key part of your treatment and safety. Be sure to make and go to all appointments. Call your doctor if you are having problems. It's also a good idea to know your test results and keep a list of the medicines you take. How can you care for yourself at home? Keep your blood sugar at a target level (which you set with your doctor). Carbohydrate--the body's main source of fuel--affects blood sugar more than any other nutrient. Carbohydrate is in fruits, vegetables, milk, and yogurt. It also is in breads, cereals, vegetables such as potatoes and corn, and sugary foods such as candy and cakes. Follow your meal plan to know how much carbohydrate to eat at each meal and snack. Aim for 30 minutes of exercise on most, preferably all, days of the week. Walking is a good choice. You also may want to do other activities, such as running, swimming, cycling, or playing tennis or team sports. Try to do muscle-strengthening exercises at least 2 times a week. Take your medicines exactly as prescribed. Call your doctor if you think you are having a problem with your medicine. You will get more details on the specific medicines your doctor prescribes. Check your blood sugar as often as your doctor recommends. It is important to keep track of any symptoms you have, such as low blood sugar. Also tell your doctor if you have any changes in your activities, diet, or insulin use. Talk to your doctor before you start taking aspirin every day. Aspirin can help certain people lower their risk of a heart attack or stroke. But taking aspirin isn't right for everyone, because it can cause serious bleeding. Do not smoke. If you need help quitting, talk to your doctor about stop-smoking programs and medicines. These can increase your chances of quitting for good. Keep your cholesterol and blood pressure at normal levels. You may need to take one or more medicines to reach your goals.  Take them

## 2023-12-06 ENCOUNTER — TELEPHONE (OUTPATIENT)
Age: 43
End: 2023-12-06

## 2023-12-06 NOTE — TELEPHONE ENCOUNTER
Patient called to check status of prior auth for her Araceli Pacer) states spoke with insurance and was advised still has not received prior auth.  Advised patient prior [address] is pending insurance approval.

## 2023-12-12 ENCOUNTER — TELEPHONE (OUTPATIENT)
Age: 43
End: 2023-12-12

## 2023-12-12 NOTE — TELEPHONE ENCOUNTER
Patient called requesting nurse call for update on prior auth, states advised by her insurance provider that has been denied and will need to start an appeal

## 2023-12-13 NOTE — TELEPHONE ENCOUNTER
Called patient and let know insurance will not cover medication until 2 other medications have been tried. Patient stated that is fine is willing to try ozempic or something else. Message sent over to provider.

## 2024-01-08 NOTE — TELEPHONE ENCOUNTER
Patient called stating her insurance will not cover the liraglutide-weight management 18 MG/3ML SOPN [[id number]] and wanted to know if she can go back on the Mounjaro which her insurance will cover.

## 2024-01-08 NOTE — TELEPHONE ENCOUNTER
Patient called stating her insurance will not cover the liraglutide-weight management 18 MG/3ML SOPN [[id number]] and wanted to know if she can go back on the Mounjaro which her insurance will cover.     Last Visit: 11/9/2023   Next Appointment: none  Previous Refill Encounter: 1/8/2024 #2ml with 0 refills    Requested Prescriptions     Pending Prescriptions Disp Refills    Tirzepatide (MOUNJARO) 7.5 MG/0.5ML SOPN SC injection       Sig: Inject 0.5 mLs into the skin once a week

## 2024-01-13 DIAGNOSIS — E11.9 TYPE 2 DIABETES MELLITUS WITHOUT COMPLICATION, WITHOUT LONG-TERM CURRENT USE OF INSULIN (HCC): Primary | ICD-10-CM

## 2024-01-29 DIAGNOSIS — E78.5 HYPERLIPIDEMIA, UNSPECIFIED HYPERLIPIDEMIA TYPE: ICD-10-CM

## 2024-01-29 NOTE — TELEPHONE ENCOUNTER
Last Visit: 11/9/2023   Next Appointment:none  Previous Refill Encounter: 8/5/2023 #30 with 3 refills    Requested Prescriptions     Pending Prescriptions Disp Refills    atorvastatin (LIPITOR) 40 MG tablet [Pharmacy Med Name: ATORVASTATIN 40 MG TABLET] 30 tablet 3     Sig: take 1 tablet by mouth once daily

## 2024-01-30 RX ORDER — ATORVASTATIN CALCIUM 40 MG/1
TABLET, FILM COATED ORAL
Qty: 30 TABLET | Refills: 3 | Status: SHIPPED | OUTPATIENT
Start: 2024-01-30

## 2024-02-13 ENCOUNTER — TELEMEDICINE (OUTPATIENT)
Age: 44
End: 2024-02-13

## 2024-02-13 DIAGNOSIS — E66.01 CLASS 3 SEVERE OBESITY DUE TO EXCESS CALORIES WITH SERIOUS COMORBIDITY AND BODY MASS INDEX (BMI) OF 45.0 TO 49.9 IN ADULT (HCC): Primary | ICD-10-CM

## 2024-02-13 DIAGNOSIS — E11.9 TYPE 2 DIABETES MELLITUS WITHOUT COMPLICATION, WITHOUT LONG-TERM CURRENT USE OF INSULIN (HCC): ICD-10-CM

## 2024-02-13 PROCEDURE — 99214 OFFICE O/P EST MOD 30 MIN: CPT | Performed by: STUDENT IN AN ORGANIZED HEALTH CARE EDUCATION/TRAINING PROGRAM

## 2024-02-13 NOTE — PROGRESS NOTES
1. \"Have you been to the ER, urgent care clinic since your last visit?  Hospitalized since your last visit?\" No    2. \"Have you seen or consulted any other health care providers outside of the [facility] since your last visit?\" No     3. For patients aged 45-75: Has the patient had a colonoscopy / FIT/ Cologuard? NA - based on age      If the patient is female:    4. For patients aged 40-74: Has the patient had a mammogram within the past 2 years? No      5. For patients aged 21-65: Has the patient had a pap smear? Yes - Care Gap present. Most recent result on file

## 2024-02-13 NOTE — PROGRESS NOTES
Ana Morales, was evaluated through a synchronous (real-time) audio-video encounter. The patient (or guardian if applicable) is aware that this is a billable service, which includes applicable co-pays. This Virtual Visit was conducted with patient's (and/or legal guardian's) consent. Patient identification was verified, and a caregiver was present when appropriate.   The patient was located at Home: [address] [address]  Provider was located at Facility (Appt Dept): [address]  [place] [address]      Ana Morales (:  1980) is a Established patient, presenting virtually for evaluation of the following:    Assessment & Plan   Below is the assessment and plan developed based on review of pertinent history, physical exam, labs, studies, and medications.  1. Class 3 severe obesity due to excess calories with serious comorbidity and body mass index (BMI) of 45.0 to 49.9 in adult ([facility])  Patient's BMI is noted to be There is no height or weight on file to calculate BMI.   It is recommended that you complete aerobic exercise 30-40 minutes a day, at least 3-5 days a week. Aerobic exercises are activities such as brisk walking, running/jogging, dancing, biking, swimming.  Patient has also been encouraged to join a gym  Age-appropriate daily physical activity goals discussed.   Healthy diet: Eat real food!  Avoid or minimize all processed food.   Counseling provided regarding avoidance of high calorie snacks and sugar beverages, including fruit juice and regular soda.   Encourage portion control and avoidance of overeating.  Patient has been encouraged to set a target weight loss of 1 to 2 pounds per week  Patient is currently on Mounjaro 5 mg q. weekly and titrating up as tolerated.  She reports no adverse effects at this time.  Plan is acceptable to patient      Type 2 diabetes mellitus without complication, without long-term current use of insulin ([facility])  Pleasant 43

## 2024-02-13 NOTE — PATIENT INSTRUCTIONS
eating healthy foods in reasonable amounts and becoming more active, even a little bit every day. Making small changes over time can add up to a lot.  Make a plan for change. Many people have found that naming their reasons for change and staying focused on their plan can make a big difference. Work with your doctor to create a plan that is right for you.  Ask yourself: \"What are my personal, most powerful reasons for wanting this change? What will my life look like when I've made the change?\"  Set your long-term goal. Make it specific, such as \"I will lose x pounds.\"  Break your long-term goal into smaller, short-term goals. Make these small steps specific and within your reach, things you know you can do. These steps are what keep you going from day to day.  Talk with your doctor about other weight-loss options. If you have a BMI in a certain range and have not been able to lose weight with diet and exercise, medicine or surgery may be an option for you. Before your doctor will prescribe medicines or surgery, he or she will probably want you to be more active and follow your healthy eating plan for a period of time. These habits are key lifelong changes for managing your weight, with or without other medical treatment. And these changes can help you avoid weight-related health problems.  How can you stay on your plan for change?  Be ready. Choose to start during a time when there are few events like holidays, social events, and high-stress periods. These events might trigger slip-ups.  Decide on your first few steps. Most people have more success when they make small changes, one step at a time. For example, you might switch a daily candy bar to a piece of fruit, walk 10 minutes more, or add more vegetables to a meal.  Line up your support people. Make sure you're not going to be alone as you make this change. Connect with people who understand how important it is to you. Ask family members and friends for help in

## 2024-07-12 ENCOUNTER — OFFICE VISIT (OUTPATIENT)
Facility: CLINIC | Age: 44
End: 2024-07-12
Payer: COMMERCIAL

## 2024-07-12 VITALS
RESPIRATION RATE: 18 BRPM | TEMPERATURE: 97.5 F | DIASTOLIC BLOOD PRESSURE: 78 MMHG | HEART RATE: 88 BPM | SYSTOLIC BLOOD PRESSURE: 130 MMHG | HEIGHT: 65 IN | WEIGHT: 293 LBS | BODY MASS INDEX: 48.82 KG/M2 | OXYGEN SATURATION: 98 %

## 2024-07-12 DIAGNOSIS — E11.9 TYPE 2 DIABETES MELLITUS WITHOUT COMPLICATION, WITHOUT LONG-TERM CURRENT USE OF INSULIN (HCC): ICD-10-CM

## 2024-07-12 DIAGNOSIS — E66.01 CLASS 3 SEVERE OBESITY DUE TO EXCESS CALORIES WITH SERIOUS COMORBIDITY AND BODY MASS INDEX (BMI) OF 50.0 TO 59.9 IN ADULT (HCC): Primary | ICD-10-CM

## 2024-07-12 PROCEDURE — 3075F SYST BP GE 130 - 139MM HG: CPT | Performed by: STUDENT IN AN ORGANIZED HEALTH CARE EDUCATION/TRAINING PROGRAM

## 2024-07-12 PROCEDURE — 3078F DIAST BP <80 MM HG: CPT | Performed by: STUDENT IN AN ORGANIZED HEALTH CARE EDUCATION/TRAINING PROGRAM

## 2024-07-12 PROCEDURE — 99214 OFFICE O/P EST MOD 30 MIN: CPT | Performed by: STUDENT IN AN ORGANIZED HEALTH CARE EDUCATION/TRAINING PROGRAM

## 2024-07-12 ASSESSMENT — ENCOUNTER SYMPTOMS
RHINORRHEA: 0
WHEEZING: 0
TROUBLE SWALLOWING: 0
COUGH: 0
SORE THROAT: 0
VOMITING: 0
DIARRHEA: 0
BACK PAIN: 0
PHOTOPHOBIA: 0
CONSTIPATION: 0
EYE DISCHARGE: 0
SHORTNESS OF BREATH: 0
EYE ITCHING: 0
NAUSEA: 0
ABDOMINAL PAIN: 0
VOICE CHANGE: 0

## 2024-07-12 NOTE — PROGRESS NOTES
1. \"Have you been to the ER, urgent care clinic since your last visit?  Hospitalized since your last visit?\"  Yes, [facility]    2. \"Have you seen or consulted any other health care providers outside of the [facility] since your last visit?\"  yes      3. For patients aged 45-75: Has the patient had a colonoscopy / FIT/ Cologuard? NA - based on age      If the patient is female:    4. For patients aged 40-74: Has the patient had a mammogram within the past 2 years? No      5. For patients aged 21-65: Has the patient had a pap smear? Yes - Care Gap present. Most recent result on file   Called pt; informed pt I was calling to confirm her virtual EAWV on 7/7/22 at 11:00am and to see if she needed any help; pt stated she did not need any help; pt informed to login 15 minutes prior to appt time

## 2024-07-12 NOTE — PROGRESS NOTES
Ana Morales (:  1980) is a 44 y.o. female,Established patient, here for evaluation of the following chief complaint(s):  Hypertension and Weight Management      Assessment & Plan   1. Class 3 severe obesity due to excess calories with serious comorbidity and body mass index (BMI) of 50.0 to 59.9 in adult ([facility])  Patient's BMI is noted to be Body mass index is 53.25 kg/m².   It is recommended that you complete aerobic exercise 30-40 minutes a day, at least 3-5 days a week. Aerobic exercises are activities such as brisk walking, running/jogging, dancing, biking, swimming.  Patient has also been encouraged to join a gym  Age-appropriate daily physical activity goals discussed.   Healthy diet: Eat real food!  Avoid or minimize all processed food.   Counseling provided regarding avoidance of high calorie snacks and sugar beverages, including fruit juice and regular soda.   Encourage portion control and avoidance of overeating.  Patient has been encouraged to set a target weight loss of 1 to 2 pounds per week  Plan is acceptable to patient      -     Tirzepatide (MOUNJARO) 12.5 MG/0.5ML SOPN SC injection; Inject 0.5 mLs into the skin once a week, Disp-2 mL, R-0Normal  -     Tirzepatide (MOUNJARO) 15 MG/0.5ML SOPN SC injection; Inject 0.5 mLs into the skin once a week, Disp-2 mL, R-0Normal    2. Type 2 diabetes mellitus without complication, without long-term current use of insulin ([facility])  Pleasant 44 y.o. with a past medical history of T2DM presenting today.  Patient will continue Mounjaro 10 mg q. weekly and titrating up as tolerated  Her most recent A1c   Hemoglobin A1C   Date Value Ref Range Status   03/10/2023 6.6 (H) 4.8 - 5.6 % Final      Encouraged to be consistent with her medications and lifestyle modifications through dietary changes and exercise.  Patient has been encouraged to check her blood sugar twice daily.    Patient has been advised to call back immediately concerns  -     Tirzepatide

## 2024-08-18 DIAGNOSIS — E66.01 CLASS 3 SEVERE OBESITY DUE TO EXCESS CALORIES WITH SERIOUS COMORBIDITY AND BODY MASS INDEX (BMI) OF 50.0 TO 59.9 IN ADULT: ICD-10-CM

## 2024-08-18 DIAGNOSIS — E11.9 TYPE 2 DIABETES MELLITUS WITHOUT COMPLICATION, WITHOUT LONG-TERM CURRENT USE OF INSULIN (HCC): ICD-10-CM

## 2024-08-18 DIAGNOSIS — E66.813 CLASS 3 SEVERE OBESITY DUE TO EXCESS CALORIES WITH SERIOUS COMORBIDITY AND BODY MASS INDEX (BMI) OF 50.0 TO 59.9 IN ADULT: ICD-10-CM

## 2024-08-19 NOTE — TELEPHONE ENCOUNTER
Last Visit: 7/122024   Next Appointment: none  Previous Refill Encounter: 7/12/2024 #2ml  with 0 refills    Requested Prescriptions     Pending Prescriptions Disp Refills    MOUNJARO 12.5 MG/0.5ML SOPN SC injection [Pharmacy Med Name: MOUNJARO 12.5 MG/0.5 ML PEN] 2 mL 0     Sig: inject 0.5 MILLILITER subcutaneously every week

## 2024-08-21 RX ORDER — TIRZEPATIDE 12.5 MG/.5ML
INJECTION, SOLUTION SUBCUTANEOUS
Qty: 2 ML | Refills: 0 | OUTPATIENT
Start: 2024-08-21

## 2024-09-03 ENCOUNTER — HOSPITAL ENCOUNTER (OUTPATIENT)
Facility: HOSPITAL | Age: 44
Discharge: HOME | End: 2024-09-06
Payer: COMMERCIAL

## 2024-09-03 VITALS — WEIGHT: 293 LBS | HEIGHT: 68 IN | BODY MASS INDEX: 44.41 KG/M2

## 2024-09-03 DIAGNOSIS — Z12.31 ENCOUNTER FOR SCREENING MAMMOGRAM FOR BREAST CANCER: ICD-10-CM

## 2024-09-03 PROCEDURE — 77063 BREAST TOMOSYNTHESIS BI: CPT

## 2024-09-07 DIAGNOSIS — E78.5 HYPERLIPIDEMIA, UNSPECIFIED HYPERLIPIDEMIA TYPE: ICD-10-CM

## 2024-09-09 NOTE — TELEPHONE ENCOUNTER
Last Visit: 7/29/2024   Next Appointment: 10/7/2024  Previous Refill Encounter: 1/30/2024 #30 with 3 refills    Requested Prescriptions     Pending Prescriptions Disp Refills    atorvastatin (LIPITOR) 40 MG tablet 30 tablet 3     Sig: Take 1 tablet by mouth daily

## 2024-09-10 RX ORDER — ATORVASTATIN CALCIUM 40 MG/1
40 TABLET, FILM COATED ORAL DAILY
Qty: 30 TABLET | Refills: 3 | OUTPATIENT
Start: 2024-09-10

## 2024-09-10 RX ORDER — ATORVASTATIN CALCIUM 20 MG/1
20 TABLET, FILM COATED ORAL DAILY
Qty: 90 TABLET | Refills: 1 | Status: SHIPPED | OUTPATIENT
Start: 2024-09-10 | End: 2025-03-09

## 2025-04-30 DIAGNOSIS — E78.5 HYPERLIPIDEMIA, UNSPECIFIED HYPERLIPIDEMIA TYPE: ICD-10-CM

## 2025-04-30 DIAGNOSIS — I10 PRIMARY HYPERTENSION: ICD-10-CM

## 2025-04-30 DIAGNOSIS — E11.9 TYPE 2 DIABETES MELLITUS WITHOUT COMPLICATION, WITHOUT LONG-TERM CURRENT USE OF INSULIN (HCC): Primary | ICD-10-CM

## 2025-05-06 ENCOUNTER — HOSPITAL ENCOUNTER (OUTPATIENT)
Facility: HOSPITAL | Age: 45
Setting detail: SPECIMEN
Discharge: HOME | End: 2025-05-09

## 2025-05-06 ENCOUNTER — LAB (OUTPATIENT)
Facility: CLINIC | Age: 45
End: 2025-05-06

## 2025-05-06 DIAGNOSIS — E11.9 TYPE 2 DIABETES MELLITUS WITHOUT COMPLICATION, WITHOUT LONG-TERM CURRENT USE OF INSULIN (HCC): ICD-10-CM

## 2025-05-06 DIAGNOSIS — E78.5 HYPERLIPIDEMIA, UNSPECIFIED HYPERLIPIDEMIA TYPE: ICD-10-CM

## 2025-05-06 DIAGNOSIS — I10 PRIMARY HYPERTENSION: ICD-10-CM

## 2025-05-06 LAB — SENTARA SPECIMEN COLLECTION: NORMAL

## 2025-05-06 PROCEDURE — 99001 SPECIMEN HANDLING PT-LAB: CPT

## 2025-05-07 LAB
A/G RATIO: 1.2 RATIO (ref 1.1–2.6)
ALBUMIN: 3.9 G/DL (ref 3.5–5)
ALP BLD-CCNC: 101 U/L (ref 25–115)
ALT SERPL-CCNC: 13 U/L (ref 5–40)
ANION GAP SERPL CALCULATED.3IONS-SCNC: 16 MMOL/L (ref 3–15)
AST SERPL-CCNC: 11 U/L (ref 10–37)
BILIRUB SERPL-MCNC: 0.3 MG/DL (ref 0.2–1.2)
BUN BLDV-MCNC: 15 MG/DL (ref 6–22)
CALCIUM SERPL-MCNC: 9.7 MG/DL (ref 8.4–10.5)
CHLORIDE BLD-SCNC: 92 MMOL/L (ref 98–110)
CHOLESTEROL, TOTAL: 145 MG/DL (ref 110–200)
CHOLESTEROL/HDL RATIO: 3.2 (ref 0–5)
CO2: 23 MMOL/L (ref 20–32)
CREAT SERPL-MCNC: 0.6 MG/DL (ref 0.5–1.2)
CREATININE, URINE  MG/DL: 90 MG/DL
ESTIMATED AVERAGE GLUCOSE: 377 MG/DL (ref 91–123)
GFR, ESTIMATED: >90 ML/MIN/1.73 SQ.M.
GLOBULIN: 3.2 G/DL (ref 2–4)
GLUCOSE: 336 MG/DL (ref 70–99)
HBA1C MFR BLD: 14.8 % (ref 4.8–5.6)
HDLC SERPL-MCNC: 45 MG/DL
LDL CHOLESTEROL: 73 MG/DL (ref 50–99)
LDL/HDL RATIO: 1.6
MICROALBUMIN/CREAT 24H UR: 472.3 MG/L (ref 0.1–17)
MICROALBUMIN/CREAT UR-RTO: 524.8 (ref 0–30)
NON-HDL CHOLESTEROL: 100 MG/DL
POTASSIUM SERPL-SCNC: 5.2 MMOL/L (ref 3.5–5.5)
SODIUM BLD-SCNC: 131 MMOL/L (ref 133–145)
TOTAL PROTEIN: 7.1 G/DL (ref 6.4–8.3)
TRIGL SERPL-MCNC: 132 MG/DL (ref 40–149)
VLDLC SERPL CALC-MCNC: 26 MG/DL (ref 8–30)

## 2025-05-08 ENCOUNTER — TELEPHONE (OUTPATIENT)
Facility: CLINIC | Age: 45
End: 2025-05-08

## 2025-05-08 NOTE — TELEPHONE ENCOUNTER
Last appointment: 07/12/2024    Next appointment: 05/12/2024    Pharmacy requesting 90 day refill for     MOUNJARO 15 MG/0.5 ML PEN    Pharmacy   [id number] - [address] -  [phone] - F [phone] (Pharmacy)

## 2025-05-08 NOTE — TELEPHONE ENCOUNTER
Unable to pend because a new order needs to be prescribed.  Last Visit: 7/12/2024   Next Appointment: 5/12/2025  Previous Refill Encounter: 8/12/2024 #2 ml  with 0 refills

## 2025-05-11 SDOH — ECONOMIC STABILITY: INCOME INSECURITY: IN THE LAST 12 MONTHS, WAS THERE A TIME WHEN YOU WERE NOT ABLE TO PAY THE MORTGAGE OR RENT ON TIME?: NO

## 2025-05-11 SDOH — ECONOMIC STABILITY: FOOD INSECURITY: WITHIN THE PAST 12 MONTHS, THE FOOD YOU BOUGHT JUST DIDN'T LAST AND YOU DIDN'T HAVE MONEY TO GET MORE.: NEVER TRUE

## 2025-05-11 SDOH — ECONOMIC STABILITY: TRANSPORTATION INSECURITY
IN THE PAST 12 MONTHS, HAS THE LACK OF TRANSPORTATION KEPT YOU FROM MEDICAL APPOINTMENTS OR FROM GETTING MEDICATIONS?: NO

## 2025-05-11 SDOH — ECONOMIC STABILITY: TRANSPORTATION INSECURITY
IN THE PAST 12 MONTHS, HAS LACK OF TRANSPORTATION KEPT YOU FROM MEETINGS, WORK, OR FROM GETTING THINGS NEEDED FOR DAILY LIVING?: NO

## 2025-05-11 SDOH — ECONOMIC STABILITY: FOOD INSECURITY: WITHIN THE PAST 12 MONTHS, YOU WORRIED THAT YOUR FOOD WOULD RUN OUT BEFORE YOU GOT MONEY TO BUY MORE.: NEVER TRUE

## 2025-05-16 ENCOUNTER — OFFICE VISIT (OUTPATIENT)
Facility: CLINIC | Age: 45
End: 2025-05-16
Payer: COMMERCIAL

## 2025-05-16 VITALS
BODY MASS INDEX: 44.41 KG/M2 | HEART RATE: 82 BPM | SYSTOLIC BLOOD PRESSURE: 136 MMHG | OXYGEN SATURATION: 98 % | DIASTOLIC BLOOD PRESSURE: 87 MMHG | TEMPERATURE: 96.4 F | HEIGHT: 68 IN | RESPIRATION RATE: 18 BRPM | WEIGHT: 293 LBS

## 2025-05-16 DIAGNOSIS — E66.813 CLASS 3 SEVERE OBESITY DUE TO EXCESS CALORIES WITH SERIOUS COMORBIDITY AND BODY MASS INDEX (BMI) OF 45.0 TO 49.9 IN ADULT (HCC): ICD-10-CM

## 2025-05-16 DIAGNOSIS — Z79.4 TYPE 2 DIABETES MELLITUS WITH DIABETIC MICROALBUMINURIA, WITH LONG-TERM CURRENT USE OF INSULIN (HCC): ICD-10-CM

## 2025-05-16 DIAGNOSIS — Z79.4 TYPE 2 DIABETES MELLITUS WITH HYPERGLYCEMIA, WITH LONG-TERM CURRENT USE OF INSULIN (HCC): Primary | ICD-10-CM

## 2025-05-16 DIAGNOSIS — I10 PRIMARY HYPERTENSION: ICD-10-CM

## 2025-05-16 DIAGNOSIS — E78.5 HYPERLIPIDEMIA, UNSPECIFIED HYPERLIPIDEMIA TYPE: ICD-10-CM

## 2025-05-16 DIAGNOSIS — E11.65 TYPE 2 DIABETES MELLITUS WITH HYPERGLYCEMIA, WITH LONG-TERM CURRENT USE OF INSULIN (HCC): Primary | ICD-10-CM

## 2025-05-16 DIAGNOSIS — Z12.11 SCREENING FOR COLON CANCER: ICD-10-CM

## 2025-05-16 DIAGNOSIS — E11.29 TYPE 2 DIABETES MELLITUS WITH DIABETIC MICROALBUMINURIA, WITH LONG-TERM CURRENT USE OF INSULIN (HCC): ICD-10-CM

## 2025-05-16 DIAGNOSIS — R80.9 TYPE 2 DIABETES MELLITUS WITH DIABETIC MICROALBUMINURIA, WITH LONG-TERM CURRENT USE OF INSULIN (HCC): ICD-10-CM

## 2025-05-16 PROCEDURE — 3075F SYST BP GE 130 - 139MM HG: CPT | Performed by: STUDENT IN AN ORGANIZED HEALTH CARE EDUCATION/TRAINING PROGRAM

## 2025-05-16 PROCEDURE — 99214 OFFICE O/P EST MOD 30 MIN: CPT | Performed by: STUDENT IN AN ORGANIZED HEALTH CARE EDUCATION/TRAINING PROGRAM

## 2025-05-16 PROCEDURE — 3046F HEMOGLOBIN A1C LEVEL >9.0%: CPT | Performed by: STUDENT IN AN ORGANIZED HEALTH CARE EDUCATION/TRAINING PROGRAM

## 2025-05-16 PROCEDURE — 3079F DIAST BP 80-89 MM HG: CPT | Performed by: STUDENT IN AN ORGANIZED HEALTH CARE EDUCATION/TRAINING PROGRAM

## 2025-05-16 RX ORDER — METFORMIN HYDROCHLORIDE 500 MG/1
1000 TABLET, EXTENDED RELEASE ORAL
Qty: 180 TABLET | Refills: 1 | Status: SHIPPED | OUTPATIENT
Start: 2025-05-16

## 2025-05-16 RX ORDER — HYDROCHLOROTHIAZIDE 12.5 MG/1
CAPSULE ORAL
Qty: 2 EACH | Refills: 5 | Status: SHIPPED | OUTPATIENT
Start: 2025-05-16

## 2025-05-16 RX ORDER — INSULIN GLARGINE 100 [IU]/ML
15 INJECTION, SOLUTION SUBCUTANEOUS NIGHTLY
Qty: 5 ADJUSTABLE DOSE PRE-FILLED PEN SYRINGE | Refills: 3 | Status: SHIPPED | OUTPATIENT
Start: 2025-05-16

## 2025-05-16 RX ORDER — AMLODIPINE AND BENAZEPRIL HYDROCHLORIDE 10; 20 MG/1; MG/1
1 CAPSULE ORAL DAILY
Qty: 90 CAPSULE | Refills: 1 | Status: SHIPPED | OUTPATIENT
Start: 2025-05-16 | End: 2025-11-12

## 2025-05-16 RX ORDER — ATORVASTATIN CALCIUM 20 MG/1
20 TABLET, FILM COATED ORAL DAILY
Qty: 90 TABLET | Refills: 1 | Status: SHIPPED | OUTPATIENT
Start: 2025-05-16 | End: 2025-11-12

## 2025-05-16 ASSESSMENT — ENCOUNTER SYMPTOMS
RHINORRHEA: 0
WHEEZING: 0
SHORTNESS OF BREATH: 0
VOMITING: 0
PHOTOPHOBIA: 0
SORE THROAT: 0
EYE ITCHING: 0
TROUBLE SWALLOWING: 0
COUGH: 0
BACK PAIN: 0
EYE DISCHARGE: 0
NAUSEA: 0
DIARRHEA: 0
VOICE CHANGE: 0
CONSTIPATION: 0
ABDOMINAL PAIN: 0

## 2025-05-16 ASSESSMENT — PATIENT HEALTH QUESTIONNAIRE - PHQ9
5. POOR APPETITE OR OVEREATING: MORE THAN HALF THE DAYS
SUM OF ALL RESPONSES TO PHQ QUESTIONS 1-9: 7
4. FEELING TIRED OR HAVING LITTLE ENERGY: MORE THAN HALF THE DAYS
10. IF YOU CHECKED OFF ANY PROBLEMS, HOW DIFFICULT HAVE THESE PROBLEMS MADE IT FOR YOU TO DO YOUR WORK, TAKE CARE OF THINGS AT HOME, OR GET ALONG WITH OTHER PEOPLE: SOMEWHAT DIFFICULT
SUM OF ALL RESPONSES TO PHQ QUESTIONS 1-9: 7
6. FEELING BAD ABOUT YOURSELF - OR THAT YOU ARE A FAILURE OR HAVE LET YOURSELF OR YOUR FAMILY DOWN: NOT AT ALL
3. TROUBLE FALLING OR STAYING ASLEEP: NOT AT ALL
9. THOUGHTS THAT YOU WOULD BE BETTER OFF DEAD, OR OF HURTING YOURSELF: NOT AT ALL
7. TROUBLE CONCENTRATING ON THINGS, SUCH AS READING THE NEWSPAPER OR WATCHING TELEVISION: NOT AT ALL
SUM OF ALL RESPONSES TO PHQ QUESTIONS 1-9: 7
2. FEELING DOWN, DEPRESSED OR HOPELESS: SEVERAL DAYS
SUM OF ALL RESPONSES TO PHQ QUESTIONS 1-9: 7
8. MOVING OR SPEAKING SO SLOWLY THAT OTHER PEOPLE COULD HAVE NOTICED. OR THE OPPOSITE, BEING SO FIGETY OR RESTLESS THAT YOU HAVE BEEN MOVING AROUND A LOT MORE THAN USUAL: NOT AT ALL
1. LITTLE INTEREST OR PLEASURE IN DOING THINGS: MORE THAN HALF THE DAYS

## 2025-05-16 NOTE — PROGRESS NOTES
Ana Morales (:  1980) is a 45 y.o. female, Established patient, here for evaluation of the following chief complaint(s):  Diabetes, Hypertension, and Weight Management         Assessment & Plan  1. Diabetes mellitus.  - Her A1c level is elevated at 14, indicating uncontrolled diabetes. The goal is to reduce her A1c to below 8 within the next 3 months.  - Lantus insulin will be initiated at a dose of 15 units nightly. Mounjaro will be reintroduced at a starting dose of 5 mg, with plans to increase the dosage in the subsequent month.  - Metformin extended-release 1000 mg will be administered twice daily. A Freestyle Kevin 3+ continuous glucose monitor will be provided for her to track her blood glucose levels.  - Jardiance 10 mg daily will also be added to her regimen to aid in diabetes management/proteinuria. The effectiveness of this treatment plan will be reassessed in 3 months.    2. Hypertension.  - Her blood pressure is currently well-controlled at 136/87.  - She will continue her current medication regimen of Lotrel 10-20 mg daily.  - The diuretic will be discontinued for now.  - Blood pressure will be monitored at follow-up visits to ensure continued control.    3. Hyperlipidemia.  - She will continue taking atorvastatin 20 mg daily.  - Lipid levels will be monitored to assess the effectiveness of the current dosage.  - Prescription Drug Monitoring Program was reviewed.  - Medication adherence will be discussed at the next visit.    4. Health maintenance.  - She has no history of colon cancer.  - She underwent a mammogram today.  - A Cologuard test will be ordered. If the Cologuard test returns positive, a colonoscopy will be necessary. If the Cologuard test is negative, it can be repeated in 3 years.  - Preventive health measures will be reviewed at follow-up visits.    Follow-up  The patient will follow up in 1 month.    Results  Labs   - A1c: 14  1. Type 2 diabetes mellitus with

## 2025-06-09 ENCOUNTER — PATIENT MESSAGE (OUTPATIENT)
Facility: CLINIC | Age: 45
End: 2025-06-09

## 2025-06-09 DIAGNOSIS — E11.65 TYPE 2 DIABETES MELLITUS WITH HYPERGLYCEMIA, WITH LONG-TERM CURRENT USE OF INSULIN (HCC): Primary | ICD-10-CM

## 2025-06-09 DIAGNOSIS — Z79.4 TYPE 2 DIABETES MELLITUS WITH HYPERGLYCEMIA, WITH LONG-TERM CURRENT USE OF INSULIN (HCC): Primary | ICD-10-CM

## 2025-06-10 RX ORDER — PEN NEEDLE, DIABETIC 30 GX3/16"
NEEDLE, DISPOSABLE MISCELLANEOUS
COMMUNITY
End: 2025-06-10 | Stop reason: SDUPTHER

## 2025-06-10 RX ORDER — PEN NEEDLE, DIABETIC 30 GX3/16"
1 NEEDLE, DISPOSABLE MISCELLANEOUS 3 TIMES DAILY
Qty: 100 EACH | Refills: 5 | Status: SHIPPED | OUTPATIENT
Start: 2025-06-10 | End: 2025-09-18

## 2025-06-10 NOTE — TELEPHONE ENCOUNTER
Last Visit: 5/16/2025   Next Appointment: 6/20/2025  Previous Refill Encounter: Insulin Pen Needle    Requested Prescriptions     Pending Prescriptions Disp Refills    Insulin Pen Needle (PEN NEEDLES) 32G X 4 MM MISC       Sig: by Does not apply route

## 2025-06-12 LAB — NONINV COLON CA DNA+OCC BLD SCRN STL QL: POSITIVE

## 2025-06-20 ENCOUNTER — OFFICE VISIT (OUTPATIENT)
Facility: CLINIC | Age: 45
End: 2025-06-20
Payer: COMMERCIAL

## 2025-06-20 VITALS
BODY MASS INDEX: 44.41 KG/M2 | DIASTOLIC BLOOD PRESSURE: 84 MMHG | SYSTOLIC BLOOD PRESSURE: 129 MMHG | HEIGHT: 68 IN | TEMPERATURE: 97.1 F | RESPIRATION RATE: 18 BRPM | HEART RATE: 94 BPM | WEIGHT: 293 LBS | OXYGEN SATURATION: 98 %

## 2025-06-20 DIAGNOSIS — E11.29 TYPE 2 DIABETES MELLITUS WITH DIABETIC MICROALBUMINURIA, WITH LONG-TERM CURRENT USE OF INSULIN (HCC): ICD-10-CM

## 2025-06-20 DIAGNOSIS — Z79.4 TYPE 2 DIABETES MELLITUS WITH DIABETIC MICROALBUMINURIA, WITH LONG-TERM CURRENT USE OF INSULIN (HCC): ICD-10-CM

## 2025-06-20 DIAGNOSIS — R80.9 TYPE 2 DIABETES MELLITUS WITH DIABETIC MICROALBUMINURIA, WITH LONG-TERM CURRENT USE OF INSULIN (HCC): ICD-10-CM

## 2025-06-20 DIAGNOSIS — E66.813 CLASS 3 SEVERE OBESITY DUE TO EXCESS CALORIES WITH SERIOUS COMORBIDITY AND BODY MASS INDEX (BMI) OF 45.0 TO 49.9 IN ADULT (HCC): ICD-10-CM

## 2025-06-20 DIAGNOSIS — I10 PRIMARY HYPERTENSION: ICD-10-CM

## 2025-06-20 DIAGNOSIS — R19.5 POSITIVE COLORECTAL CANCER SCREENING USING DNA-BASED STOOL TEST: ICD-10-CM

## 2025-06-20 DIAGNOSIS — E78.5 HYPERLIPIDEMIA, UNSPECIFIED HYPERLIPIDEMIA TYPE: ICD-10-CM

## 2025-06-20 DIAGNOSIS — Z79.4 TYPE 2 DIABETES MELLITUS WITH HYPERGLYCEMIA, WITH LONG-TERM CURRENT USE OF INSULIN (HCC): Primary | ICD-10-CM

## 2025-06-20 DIAGNOSIS — E11.65 TYPE 2 DIABETES MELLITUS WITH HYPERGLYCEMIA, WITH LONG-TERM CURRENT USE OF INSULIN (HCC): Primary | ICD-10-CM

## 2025-06-20 PROCEDURE — 3079F DIAST BP 80-89 MM HG: CPT | Performed by: STUDENT IN AN ORGANIZED HEALTH CARE EDUCATION/TRAINING PROGRAM

## 2025-06-20 PROCEDURE — 3074F SYST BP LT 130 MM HG: CPT | Performed by: STUDENT IN AN ORGANIZED HEALTH CARE EDUCATION/TRAINING PROGRAM

## 2025-06-20 PROCEDURE — 3046F HEMOGLOBIN A1C LEVEL >9.0%: CPT | Performed by: STUDENT IN AN ORGANIZED HEALTH CARE EDUCATION/TRAINING PROGRAM

## 2025-06-20 PROCEDURE — 99214 OFFICE O/P EST MOD 30 MIN: CPT | Performed by: STUDENT IN AN ORGANIZED HEALTH CARE EDUCATION/TRAINING PROGRAM

## 2025-06-20 ASSESSMENT — ENCOUNTER SYMPTOMS
WHEEZING: 0
SORE THROAT: 0
COUGH: 0
EYE DISCHARGE: 0
TROUBLE SWALLOWING: 0
ABDOMINAL PAIN: 0
SHORTNESS OF BREATH: 0
CONSTIPATION: 0
BACK PAIN: 0
RHINORRHEA: 0
EYE ITCHING: 0
NAUSEA: 0
VOMITING: 0
VOICE CHANGE: 0
DIARRHEA: 0
PHOTOPHOBIA: 0

## 2025-06-20 ASSESSMENT — PATIENT HEALTH QUESTIONNAIRE - PHQ9
SUM OF ALL RESPONSES TO PHQ QUESTIONS 1-9: 0
1. LITTLE INTEREST OR PLEASURE IN DOING THINGS: NOT AT ALL
SUM OF ALL RESPONSES TO PHQ QUESTIONS 1-9: 0
2. FEELING DOWN, DEPRESSED OR HOPELESS: NOT AT ALL

## 2025-06-20 NOTE — PROGRESS NOTES
Ana Morales (:  1980) is a 45 y.o. female, Established patient, here for evaluation of the following chief complaint(s):  Diabetes         Assessment & Plan  1. Diabetes Mellitus  - Blood glucose levels have shown improvement, with morning readings around 137 and 120  - Currently taking Jardiance 10 mg daily, insulin 15 units nightly, metformin 1000 mg twice daily, and Mounjaro 5 mg weekly  - Counseled on benefits of  CGM and diabetes management, and advised to ensure compliance with its use  - Repeat A1c test scheduled for 2025  - Prescription refill for Jardiance 10 mg provided for 90 days, with instructions to adjust the duration to 30 days if the cost becomes prohibitive  - Dosage of Mounjaro will be increased during the next visit    2. Hypertension  - Blood pressure is well-controlled at 129/84  - Continuing current regimen of Lotrel 10-20 mg daily, along with dietary modifications and regular exercise    3. Hyperlipidemia  - Maintaining current Lipitor 20 mg regimen    4. Abnormal Cologuard test  - Referral to gastroenterology made for further evaluation  - Instructed to contact gastroenterology if no call is received within 2 weeks    Follow-up  - Follow up in 1 month    Results  - Labs:    - Cologuard test: Abnormal  1. Type 2 diabetes mellitus with hyperglycemia, with long-term current use of insulin ([facility])  -     empagliflozin (JARDIANCE) 10 MG tablet; Take 1 tablet by mouth daily, Disp-90 tablet, R-1Normal  2. Type 2 diabetes mellitus with diabetic microalbuminuria, with long-term current use of insulin ([facility])  -     empagliflozin (JARDIANCE) 10 MG tablet; Take 1 tablet by mouth daily, Disp-90 tablet, R-1Normal  3. Positive colorectal cancer screening using DNA-based stool test  -     Amb External Referral To Gastroenterology  4. Primary hypertension  5. Class 3 severe obesity due to excess calories with serious comorbidity and body mass index (BMI) of 45.0 to 49.9 in adult ([facility])  6.

## 2025-06-20 NOTE — PROGRESS NOTES
\"Have you been to the ER, urgent care clinic since your last visit?  Hospitalized since your last visit?\"    NO    “Have you seen or consulted any other health care providers outside our system since your last visit?”    NO      “Have you had a diabetic eye exam?”    NO     No diabetic eye exam on file       Click Here for Release of Records Request